# Patient Record
Sex: FEMALE | Race: WHITE | HISPANIC OR LATINO | Employment: UNEMPLOYED | URBAN - METROPOLITAN AREA
[De-identification: names, ages, dates, MRNs, and addresses within clinical notes are randomized per-mention and may not be internally consistent; named-entity substitution may affect disease eponyms.]

---

## 2020-03-31 ENCOUNTER — HOSPITAL ENCOUNTER (EMERGENCY)
Facility: HOSPITAL | Age: 25
Discharge: HOME | End: 2020-03-31
Attending: EMERGENCY MEDICINE
Payer: OTHER GOVERNMENT

## 2020-03-31 VITALS
RESPIRATION RATE: 18 BRPM | HEIGHT: 64 IN | OXYGEN SATURATION: 99 % | TEMPERATURE: 101 F | SYSTOLIC BLOOD PRESSURE: 147 MMHG | WEIGHT: 220 LBS | BODY MASS INDEX: 37.56 KG/M2 | DIASTOLIC BLOOD PRESSURE: 69 MMHG | HEART RATE: 99 BPM

## 2020-03-31 DIAGNOSIS — B34.9 VIRAL ILLNESS: ICD-10-CM

## 2020-03-31 DIAGNOSIS — Z20.822 SUSPECTED COVID-19 VIRUS INFECTION: Primary | ICD-10-CM

## 2020-03-31 LAB
B-HCG UR QL: NEGATIVE
CTP QC/QA: YES
CTP QC/QA: YES
POC MOLECULAR INFLUENZA A AGN: NEGATIVE
POC MOLECULAR INFLUENZA B AGN: NEGATIVE

## 2020-03-31 PROCEDURE — 99283 EMERGENCY DEPT VISIT LOW MDM: CPT | Mod: 25

## 2020-03-31 PROCEDURE — 87502 INFLUENZA DNA AMP PROBE: CPT

## 2020-03-31 PROCEDURE — 25000003 PHARM REV CODE 250: Performed by: PHYSICIAN ASSISTANT

## 2020-03-31 PROCEDURE — 81025 URINE PREGNANCY TEST: CPT | Performed by: PHYSICIAN ASSISTANT

## 2020-03-31 RX ORDER — ACETAMINOPHEN 500 MG
1000 TABLET ORAL
Status: COMPLETED | OUTPATIENT
Start: 2020-03-31 | End: 2020-03-31

## 2020-03-31 RX ADMIN — ACETAMINOPHEN 1000 MG: 500 TABLET ORAL at 10:03

## 2020-03-31 NOTE — ED PROVIDER NOTES
Encounter Date: 3/31/2020    SCRIBE #1 NOTE: I, Marilu Jenkins, am scribing for, and in the presence of,  WILLIAM Arnold. I have scribed the following portions of the note - Other sections scribed: HPI, ROS.       History     Chief Complaint   Patient presents with    Fever     Fever and cough starting yesterday. Denies any SOB. RR unlabored.      Chief Complaint: Fever; Chills; Body Aches  History of Present Illness: History obtained from patient. This 24 y.o. female with no known PMHx presents to the ED complaining of fever, chills, and generalized body aches that started yesterday. She reports having associated headache, nausea, and cough. She states that she's had a decrease in appetite. She notes taking Tylenol for treatment and her last dose was today at 0300. She denies any known sick contact. Denies SOB, chest pain, vomiting, and diarrhea. No other associated symptoms. No known sick contacts.  No recent travel. No risk factors including active cancer, chronic lung disease, history of organ transplant, use of immunosuppressive medications, hemodialysis, advanced HIV.  Patient does not live in a communal setting including nursing facility or home shelter.  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      The history is provided by the patient. No  was used.     Review of patient's allergies indicates:  No Known Allergies  History reviewed. No pertinent past medical history.  Past Surgical History:   Procedure Laterality Date     SECTION      TUBAL LIGATION       History reviewed. No pertinent family history.  Social History     Tobacco Use    Smoking status: Never Smoker   Substance Use Topics    Alcohol use: Never     Frequency: Never    Drug use: Never     Review of Systems   Constitutional: Positive for appetite change, chills and fever.   HENT: Negative for sore throat.    Respiratory: Positive for cough. Negative for  shortness of breath.    Cardiovascular: Negative for chest pain.   Gastrointestinal: Positive for nausea. Negative for diarrhea and vomiting.   Genitourinary: Negative for dysuria.   Musculoskeletal: Positive for myalgias. Negative for back pain.   Skin: Negative for rash.   Neurological: Positive for headaches. Negative for weakness.   Hematological: Does not bruise/bleed easily.       Physical Exam     Initial Vitals [03/31/20 1006]   BP Pulse Resp Temp SpO2   (!) 143/85 108 18 (!) 101.4 °F (38.6 °C) 100 %      MAP       --         Physical Exam    Nursing note and vitals reviewed.  Constitutional: She appears well-developed and well-nourished. No distress.   HENT:   Head: Normocephalic and atraumatic.   Right Ear: Tympanic membrane normal.   Left Ear: Tympanic membrane normal.   Nose: Nose normal.   Mouth/Throat: Uvula is midline, oropharynx is clear and moist and mucous membranes are normal.   Eyes: EOM are normal. Pupils are equal, round, and reactive to light.   Neck: Trachea normal, normal range of motion, full passive range of motion without pain and phonation normal. Neck supple. No stridor present. No spinous process tenderness and no muscular tenderness present. Normal range of motion present. No neck rigidity.   Cardiovascular: Normal rate, regular rhythm and normal heart sounds. Exam reveals no gallop and no friction rub.    No murmur heard.  Pulmonary/Chest: Effort normal and breath sounds normal. No respiratory distress. She has no wheezes. She has no rhonchi. She has no rales.   Abdominal: Soft. Bowel sounds are normal. There is no tenderness. There is no rebound and no guarding.   Musculoskeletal: Normal range of motion.   Neurological: She is alert and oriented to person, place, and time. She has normal strength. No cranial nerve deficit or sensory deficit.   Skin: Skin is warm and dry. Capillary refill takes less than 2 seconds.   Psychiatric: She has a normal mood and affect.         ED Course    Procedures  Labs Reviewed   POCT URINE PREGNANCY   POCT INFLUENZA A/B MOLECULAR          Imaging Results    None          Medical Decision Making:   Clinical Tests:   Lab Tests: Ordered and Reviewed  ED Management:  DDx:  Influenza, viral syndrome, COVID-19, strep pharyngitis, viral pharyngitis, otitis media, sinusitis, pneumonia, bronchitis, meningitis, sepsis, others    HPI and physical exam as above.      The patient appears to have a viral respiratory infection.  Based upon the history and physical exam the patient does not appear to have a serious bacterial infection such as sepsis, otitis media, bacterial sinusitis, strep pharyngitis, parapharyngeal or peritonsillar abscess, meningitis.  Influenza swab was negative.  Respiratory effort is normal without tachypnea or obvious signs of respiratory distress. Lungs are clear to auscultation bilaterally in all fields. Mucous membranes are moist and the patient is tolerating P.O. without difficulty.  The patient will not be tested for SARS-COV-2 given absence of risk factors in compliance with current testing guidelines.  Patient is febrile.  Fever improved after treatment in the ED with Tylenol.  Patient is nontoxic, alert, active, and appears very well at this time just prior to discharge.  Room air oxygen saturation 99%.  I have given specific return precautions to the patient regarding dyspnea.  I will prescribe medications to treat the patient's symptoms.     The results and physical exam findings were reviewed with the patient.  I advised the patient to remain home and self isolate from others for 2 weeks. The patient should wear a surgical mask at all times, especially if she must be around others.  Strict ED return precautions given concerning worsening shortness of breath/dyspnea. All questions regarding diagnosis and plan were answered to the patient's fullest possible satisfaction. Patient expressed understanding of diagnosis, discharge instructions, and  return precautions.     I used full PPE and gloves while evaluating this patient. I also used a CAPR while evaluating this patient. I cleaned my stethoscope before and after evaluating along with my hands.               Scribe Attestation:   Scribe #1: I performed the above scribed service and the documentation accurately describes the services I performed. I attest to the accuracy of the note.                          Clinical Impression:       ICD-10-CM ICD-9-CM   1. Suspected Covid-19 Virus Infection R68.89    2. Viral illness B34.9 079.99             ED Disposition Condition    Discharge Stable        ED Prescriptions     None        Follow-up Information     Follow up With Specialties Details Why Contact Info    [facility]  Schedule an appointment as soon as possible for a visit in 1 day For reevaluation [address] [place] [address]  [phone]      [facility] Go in 1 day If symptoms worsen [address]  [facility] [id number] [phone]                      Scribe Attestation: I,Roman Nicole , personally performed the services described in this documentation. All medical record entries made by the scribe were at my direction and in my presence. I have reviewed the chart and agree that the record reflects my personal performance and is accurate and complete.               Roman Nicole PA-C  03/31/20 1036

## 2020-04-16 ENCOUNTER — HOSPITAL ENCOUNTER (INPATIENT)
Facility: HOSPITAL | Age: 25
LOS: 11 days | Discharge: HOME | DRG: 982 | End: 2020-04-27
Attending: EMERGENCY MEDICINE | Admitting: INTERNAL MEDICINE
Payer: OTHER GOVERNMENT

## 2020-04-16 DIAGNOSIS — I80.202 PHLEGMASIA CERULEA DOLENS OF LEFT LOWER EXTREMITY: ICD-10-CM

## 2020-04-16 DIAGNOSIS — I82.422 ACUTE DEEP VEIN THROMBOSIS (DVT) OF ILIAC VEIN OF LEFT LOWER EXTREMITY: ICD-10-CM

## 2020-04-16 DIAGNOSIS — R00.0 TACHYCARDIA: ICD-10-CM

## 2020-04-16 DIAGNOSIS — I82.412 ACUTE DEEP VEIN THROMBOSIS (DVT) OF FEMORAL VEIN OF LEFT LOWER EXTREMITY: ICD-10-CM

## 2020-04-16 DIAGNOSIS — I82.409 DVT (DEEP VENOUS THROMBOSIS): ICD-10-CM

## 2020-04-16 DIAGNOSIS — I82.422 ACUTE DEEP VEIN THROMBOSIS OF LEFT ILIAC VEIN: ICD-10-CM

## 2020-04-16 DIAGNOSIS — I82.4Z2 ACUTE DEEP VEIN THROMBOSIS (DVT) OF DISTAL VEIN OF LEFT LOWER EXTREMITY: Primary | ICD-10-CM

## 2020-04-16 DIAGNOSIS — U07.1 COVID-19 VIRUS DETECTED: ICD-10-CM

## 2020-04-16 DIAGNOSIS — M79.605 ACUTE PAIN OF LEFT LOWER EXTREMITY: ICD-10-CM

## 2020-04-16 PROBLEM — Z20.822 SUSPECTED COVID-19 VIRUS INFECTION: Status: ACTIVE | Noted: 2020-04-16

## 2020-04-16 PROBLEM — E66.9 OBESITY (BMI 35.0-39.9 WITHOUT COMORBIDITY): Status: ACTIVE | Noted: 2020-04-16

## 2020-04-16 LAB
ALBUMIN SERPL BCP-MCNC: 4.4 G/DL (ref 3.5–5.2)
ALP SERPL-CCNC: 67 U/L (ref 55–135)
ALT SERPL W/O P-5'-P-CCNC: 35 U/L (ref 10–44)
ANION GAP SERPL CALC-SCNC: 11 MMOL/L (ref 8–16)
APTT BLDCRRT: 29.6 SEC (ref 21–32)
AST SERPL-CCNC: 26 U/L (ref 10–40)
B-HCG UR QL: NEGATIVE
BASOPHILS # BLD AUTO: 0.01 K/UL (ref 0–0.2)
BASOPHILS # BLD AUTO: 0.02 K/UL (ref 0–0.2)
BASOPHILS NFR BLD: 0.1 % (ref 0–1.9)
BASOPHILS NFR BLD: 0.2 % (ref 0–1.9)
BILIRUB SERPL-MCNC: 0.8 MG/DL (ref 0.1–1)
BUN SERPL-MCNC: 9 MG/DL (ref 6–20)
CALCIUM SERPL-MCNC: 10 MG/DL (ref 8.7–10.5)
CHLORIDE SERPL-SCNC: 107 MMOL/L (ref 95–110)
CK SERPL-CCNC: 108 U/L (ref 20–180)
CO2 SERPL-SCNC: 23 MMOL/L (ref 23–29)
CREAT SERPL-MCNC: 0.9 MG/DL (ref 0.5–1.4)
CRP SERPL-MCNC: 67.3 MG/L (ref 0–8.2)
CTP QC/QA: YES
DIFFERENTIAL METHOD: ABNORMAL
DIFFERENTIAL METHOD: ABNORMAL
EOSINOPHIL # BLD AUTO: 0.1 K/UL (ref 0–0.5)
EOSINOPHIL # BLD AUTO: 0.2 K/UL (ref 0–0.5)
EOSINOPHIL NFR BLD: 0.8 % (ref 0–8)
EOSINOPHIL NFR BLD: 1.9 % (ref 0–8)
ERYTHROCYTE [DISTWIDTH] IN BLOOD BY AUTOMATED COUNT: 12.5 % (ref 11.5–14.5)
ERYTHROCYTE [DISTWIDTH] IN BLOOD BY AUTOMATED COUNT: 12.8 % (ref 11.5–14.5)
EST. GFR  (AFRICAN AMERICAN): >60 ML/MIN/1.73 M^2
EST. GFR  (NON AFRICAN AMERICAN): >60 ML/MIN/1.73 M^2
FERRITIN SERPL-MCNC: 233 NG/ML (ref 20–300)
GLUCOSE SERPL-MCNC: 107 MG/DL (ref 70–110)
HCT VFR BLD AUTO: 30.8 % (ref 37–48.5)
HCT VFR BLD AUTO: 36.5 % (ref 37–48.5)
HGB BLD-MCNC: 11.7 G/DL (ref 12–16)
HGB BLD-MCNC: 9.9 G/DL (ref 12–16)
IMM GRANULOCYTES # BLD AUTO: 0.06 K/UL (ref 0–0.04)
IMM GRANULOCYTES # BLD AUTO: 0.07 K/UL (ref 0–0.04)
IMM GRANULOCYTES NFR BLD AUTO: 0.7 % (ref 0–0.5)
IMM GRANULOCYTES NFR BLD AUTO: 0.8 % (ref 0–0.5)
INR PPP: 1 (ref 0.8–1.2)
LDH SERPL L TO P-CCNC: 309 U/L (ref 110–260)
LYMPHOCYTES # BLD AUTO: 0.7 K/UL (ref 1–4.8)
LYMPHOCYTES # BLD AUTO: 1.4 K/UL (ref 1–4.8)
LYMPHOCYTES NFR BLD: 15.7 % (ref 18–48)
LYMPHOCYTES NFR BLD: 7.5 % (ref 18–48)
MCH RBC QN AUTO: 27.6 PG (ref 27–31)
MCH RBC QN AUTO: 27.7 PG (ref 27–31)
MCHC RBC AUTO-ENTMCNC: 32.1 G/DL (ref 32–36)
MCHC RBC AUTO-ENTMCNC: 32.1 G/DL (ref 32–36)
MCV RBC AUTO: 86 FL (ref 82–98)
MCV RBC AUTO: 86 FL (ref 82–98)
MONOCYTES # BLD AUTO: 0.7 K/UL (ref 0.3–1)
MONOCYTES # BLD AUTO: 0.8 K/UL (ref 0.3–1)
MONOCYTES NFR BLD: 8.1 % (ref 4–15)
MONOCYTES NFR BLD: 9.1 % (ref 4–15)
NEUTROPHILS # BLD AUTO: 6.6 K/UL (ref 1.8–7.7)
NEUTROPHILS # BLD AUTO: 7.3 K/UL (ref 1.8–7.7)
NEUTROPHILS NFR BLD: 73.3 % (ref 38–73)
NEUTROPHILS NFR BLD: 81.8 % (ref 38–73)
NRBC BLD-RTO: 0 /100 WBC
NRBC BLD-RTO: 0 /100 WBC
PLATELET # BLD AUTO: 252 K/UL (ref 150–350)
PLATELET # BLD AUTO: 316 K/UL (ref 150–350)
PMV BLD AUTO: 11.4 FL (ref 9.2–12.9)
PMV BLD AUTO: 11.6 FL (ref 9.2–12.9)
POTASSIUM SERPL-SCNC: 4.5 MMOL/L (ref 3.5–5.1)
PROCALCITONIN SERPL IA-MCNC: 0.02 NG/ML
PROT SERPL-MCNC: 8.6 G/DL (ref 6–8.4)
PROTHROMBIN TIME: 11.4 SEC (ref 9–12.5)
RBC # BLD AUTO: 3.58 M/UL (ref 4–5.4)
RBC # BLD AUTO: 4.24 M/UL (ref 4–5.4)
SARS-COV-2 RDRP RESP QL NAA+PROBE: POSITIVE
SODIUM SERPL-SCNC: 141 MMOL/L (ref 136–145)
WBC # BLD AUTO: 8.96 K/UL (ref 3.9–12.7)
WBC # BLD AUTO: 8.97 K/UL (ref 3.9–12.7)

## 2020-04-16 PROCEDURE — 85730 THROMBOPLASTIN TIME PARTIAL: CPT | Mod: 91

## 2020-04-16 PROCEDURE — 20000000 HC ICU ROOM

## 2020-04-16 PROCEDURE — 83615 LACTATE (LD) (LDH) ENZYME: CPT

## 2020-04-16 PROCEDURE — 82550 ASSAY OF CK (CPK): CPT

## 2020-04-16 PROCEDURE — 86140 C-REACTIVE PROTEIN: CPT

## 2020-04-16 PROCEDURE — 85025 COMPLETE CBC W/AUTO DIFF WBC: CPT | Mod: 91

## 2020-04-16 PROCEDURE — 82728 ASSAY OF FERRITIN: CPT

## 2020-04-16 PROCEDURE — 96376 TX/PRO/DX INJ SAME DRUG ADON: CPT

## 2020-04-16 PROCEDURE — U0002 COVID-19 LAB TEST NON-CDC: HCPCS

## 2020-04-16 PROCEDURE — 36415 COLL VENOUS BLD VENIPUNCTURE: CPT

## 2020-04-16 PROCEDURE — 81025 URINE PREGNANCY TEST: CPT | Performed by: EMERGENCY MEDICINE

## 2020-04-16 PROCEDURE — 85730 THROMBOPLASTIN TIME PARTIAL: CPT

## 2020-04-16 PROCEDURE — 84145 PROCALCITONIN (PCT): CPT

## 2020-04-16 PROCEDURE — 96375 TX/PRO/DX INJ NEW DRUG ADDON: CPT

## 2020-04-16 PROCEDURE — 93005 ELECTROCARDIOGRAM TRACING: CPT

## 2020-04-16 PROCEDURE — 85379 FIBRIN DEGRADATION QUANT: CPT

## 2020-04-16 PROCEDURE — 96365 THER/PROPH/DIAG IV INF INIT: CPT

## 2020-04-16 PROCEDURE — 25000003 PHARM REV CODE 250: Performed by: RADIOLOGY

## 2020-04-16 PROCEDURE — 85610 PROTHROMBIN TIME: CPT | Mod: 91

## 2020-04-16 PROCEDURE — 63600175 PHARM REV CODE 636 W HCPCS: Performed by: RADIOLOGY

## 2020-04-16 PROCEDURE — 99291 CRITICAL CARE FIRST HOUR: CPT | Mod: 25

## 2020-04-16 PROCEDURE — 25000003 PHARM REV CODE 250: Performed by: EMERGENCY MEDICINE

## 2020-04-16 PROCEDURE — 85610 PROTHROMBIN TIME: CPT

## 2020-04-16 PROCEDURE — 85384 FIBRINOGEN ACTIVITY: CPT

## 2020-04-16 PROCEDURE — 63600175 PHARM REV CODE 636 W HCPCS: Performed by: EMERGENCY MEDICINE

## 2020-04-16 PROCEDURE — 96366 THER/PROPH/DIAG IV INF ADDON: CPT

## 2020-04-16 PROCEDURE — 25500020 PHARM REV CODE 255: Performed by: INTERNAL MEDICINE

## 2020-04-16 PROCEDURE — 80053 COMPREHEN METABOLIC PANEL: CPT

## 2020-04-16 RX ORDER — FENTANYL CITRATE 50 UG/ML
INJECTION, SOLUTION INTRAMUSCULAR; INTRAVENOUS CODE/TRAUMA/SEDATION MEDICATION
Status: COMPLETED | OUTPATIENT
Start: 2020-04-16 | End: 2020-04-16

## 2020-04-16 RX ORDER — ONDANSETRON 4 MG/1
4 TABLET, ORALLY DISINTEGRATING ORAL
Status: COMPLETED | OUTPATIENT
Start: 2020-04-16 | End: 2020-04-16

## 2020-04-16 RX ORDER — MIDAZOLAM HYDROCHLORIDE 1 MG/ML
INJECTION INTRAMUSCULAR; INTRAVENOUS CODE/TRAUMA/SEDATION MEDICATION
Status: COMPLETED | OUTPATIENT
Start: 2020-04-16 | End: 2020-04-16

## 2020-04-16 RX ORDER — HYDROCODONE BITARTRATE AND ACETAMINOPHEN 5; 325 MG/1; MG/1
1 TABLET ORAL
Status: COMPLETED | OUTPATIENT
Start: 2020-04-16 | End: 2020-04-16

## 2020-04-16 RX ORDER — DEXTROMETHORPHAN HYDROBROMIDE, GUAIFENESIN 5; 100 MG/5ML; MG/5ML
650 LIQUID ORAL EVERY 8 HOURS
Status: ON HOLD | COMMUNITY
End: 2020-04-27 | Stop reason: HOSPADM

## 2020-04-16 RX ORDER — HYDROMORPHONE HYDROCHLORIDE 2 MG/ML
0.5 INJECTION, SOLUTION INTRAMUSCULAR; INTRAVENOUS; SUBCUTANEOUS
Status: COMPLETED | OUTPATIENT
Start: 2020-04-16 | End: 2020-04-16

## 2020-04-16 RX ORDER — HEPARIN SODIUM 10000 [USP'U]/100ML
500 INJECTION, SOLUTION INTRAVENOUS CONTINUOUS
Status: DISCONTINUED | OUTPATIENT
Start: 2020-04-16 | End: 2020-04-18

## 2020-04-16 RX ORDER — SODIUM CHLORIDE 0.9 % (FLUSH) 0.9 %
10 SYRINGE (ML) INJECTION
Status: DISCONTINUED | OUTPATIENT
Start: 2020-04-16 | End: 2020-04-27 | Stop reason: HOSPADM

## 2020-04-16 RX ORDER — HEPARIN SODIUM,PORCINE/D5W 25000/250
18 INTRAVENOUS SOLUTION INTRAVENOUS CONTINUOUS
Status: DISCONTINUED | OUTPATIENT
Start: 2020-04-16 | End: 2020-04-16

## 2020-04-16 RX ORDER — NALOXONE HCL 0.4 MG/ML
0.02 VIAL (ML) INJECTION
Status: DISCONTINUED | OUTPATIENT
Start: 2020-04-16 | End: 2020-04-27 | Stop reason: HOSPADM

## 2020-04-16 RX ORDER — HYDROMORPHONE HCL IN 0.9% NACL 6 MG/30 ML
PATIENT CONTROLLED ANALGESIA SYRINGE INTRAVENOUS CONTINUOUS
Status: DISCONTINUED | OUTPATIENT
Start: 2020-04-16 | End: 2020-04-18

## 2020-04-16 RX ORDER — SODIUM CHLORIDE 9 MG/ML
INJECTION, SOLUTION INTRAVENOUS CONTINUOUS
Status: DISCONTINUED | OUTPATIENT
Start: 2020-04-16 | End: 2020-04-16

## 2020-04-16 RX ADMIN — HEPARIN SODIUM AND DEXTROSE 200 UNITS/HR: 10000; 5 INJECTION INTRAVENOUS at 07:04

## 2020-04-16 RX ADMIN — HEPARIN SODIUM 18 UNITS/KG/HR: 10000 INJECTION, SOLUTION INTRAVENOUS at 02:04

## 2020-04-16 RX ADMIN — HYDROCODONE BITARTRATE AND ACETAMINOPHEN 1 TABLET: 5; 325 TABLET ORAL at 10:04

## 2020-04-16 RX ADMIN — FENTANYL CITRATE 50 MCG: 50 INJECTION INTRAMUSCULAR; INTRAVENOUS at 06:04

## 2020-04-16 RX ADMIN — IOHEXOL 42 ML: 300 INJECTION, SOLUTION INTRAVENOUS at 08:04

## 2020-04-16 RX ADMIN — FENTANYL CITRATE 25 MCG: 50 INJECTION INTRAMUSCULAR; INTRAVENOUS at 07:04

## 2020-04-16 RX ADMIN — ALTEPLASE 1 MG/HR: 2.2 INJECTION, POWDER, LYOPHILIZED, FOR SOLUTION INTRAVENOUS at 07:04

## 2020-04-16 RX ADMIN — ALTEPLASE 2 MG: 2.2 INJECTION, POWDER, LYOPHILIZED, FOR SOLUTION INTRAVENOUS at 06:04

## 2020-04-16 RX ADMIN — MIDAZOLAM HYDROCHLORIDE 0.5 MG: 1 INJECTION, SOLUTION INTRAMUSCULAR; INTRAVENOUS at 06:04

## 2020-04-16 RX ADMIN — HYDROMORPHONE HYDROCHLORIDE 0.5 MG: 2 INJECTION INTRAMUSCULAR; INTRAVENOUS; SUBCUTANEOUS at 12:04

## 2020-04-16 RX ADMIN — Medication: at 09:04

## 2020-04-16 RX ADMIN — MIDAZOLAM HYDROCHLORIDE 0.5 MG: 1 INJECTION, SOLUTION INTRAMUSCULAR; INTRAVENOUS at 07:04

## 2020-04-16 RX ADMIN — FENTANYL CITRATE 50 MCG: 50 INJECTION INTRAMUSCULAR; INTRAVENOUS at 07:04

## 2020-04-16 RX ADMIN — MIDAZOLAM HYDROCHLORIDE 1 MG: 1 INJECTION, SOLUTION INTRAMUSCULAR; INTRAVENOUS at 07:04

## 2020-04-16 RX ADMIN — ALTEPLASE 10 MG: 2.2 INJECTION, POWDER, LYOPHILIZED, FOR SOLUTION INTRAVENOUS at 07:04

## 2020-04-16 RX ADMIN — MIDAZOLAM HYDROCHLORIDE 1 MG: 1 INJECTION, SOLUTION INTRAMUSCULAR; INTRAVENOUS at 06:04

## 2020-04-16 RX ADMIN — ONDANSETRON 4 MG: 4 TABLET, ORALLY DISINTEGRATING ORAL at 12:04

## 2020-04-16 RX ADMIN — FENTANYL CITRATE 25 MCG: 50 INJECTION INTRAMUSCULAR; INTRAVENOUS at 06:04

## 2020-04-16 NOTE — ED NOTES
Pt reports left upper leg pain pain since Tuesday, reports pain started in lower back and now in leg. Swelling noted to left upper leg with mild redness.

## 2020-04-16 NOTE — ASSESSMENT & PLAN NOTE
Palliative Care Focus Note  Palliative Care Coordinator made attempt to reach daughter Daisy.  Able to leave a message requesting return call.  Purpose of call was to share that [employer] does not have a copy of the activation for Franchesca's power of  for health care.  Asked that Daisy bring a copy in next time she comes to visit her mom.     Addendum: Received voicemail from daughter this afternoon.  She expressed that she was here at the hospital and had a copy of activation form.  Writer was unable to see daughter when she was here and checked with SAMANTHA Zelaya caring for Franchseca today.  She did not have a copy of the form. Writer placed call out to daughter again this afternoon and asked that if she is visiting over the weekend, to leave a copy with the nurse.      Thank you again for involving Palliative Care in the care of Franchesca Kirkpatrick.  We will continue to follow along with you.    ISSA Chaparro, [facility]  Palliative Care Coordinator  [facility]   Phone Number: [phone]     Recommend diet and lifestyle modifications outpatient

## 2020-04-16 NOTE — CONSULTS
Inpatient Radiology Pre-procedure Note    History of Present Illness:  Wayne Garces is a 24 y.o. female with no pertinent PMHx who presents to [facility] ED with c/o acute onset of severe LLE pain with swelling that began approximately 4 days prior with significant interval worsening of symptoms over the last 48 hours. Pt describes decreased ROM of left foot but not altered sensation at this time. No h/o smoking, OCP use thrombogenic disorders, prior DVT ect. However, pt reports relative immobility over previous 2 weeks 2/2 to fatigue and muscle aches.     LLE venous US shows acute, extensive occlusive DVT extending from the distal tibial veins through the femoropopliteal veins and into the ipsilateral iliac veins increasing risk of potential development of PTS which can be significantly debilitating, especially in pt of 25 y/o age. Also, increase risk of thromboembolic phenomenon given extension of DVT above the inguinal ligament.     Plan was for pt to be transferred to [facility] for Vasc Surg consult and intervention however, transfer is reportedly not possible given pts +COVID19 status.    Given this, new inpatient IR consult placed to evaluate for potential LLE venogram and potential intervention.     Admission H&P reviewed.  History reviewed. No pertinent past medical history.  Past Surgical History:   Procedure Laterality Date     SECTION      TUBAL LIGATION       Review of Systems:   As documented in primary team H&P    Home Meds:   Prior to Admission medications    Medication Sig Start Date End Date Taking? Authorizing Provider   acetaminophen (TYLENOL) 650 MG TbSR Take 650 mg by mouth every 8 (eight) hours.   Yes Historical Provider, MD     Scheduled Meds:    alteplase (ACTIVASE) 10 mg in 0.9% NaCl 100 mL  10 mg Intra-Catheter Once    alteplase  2 mg Intra-Catheter Once    alteplase  2 mg Intra-Catheter Once     Continuous Infusions:    heparin (porcine) in D5W 18 Units/kg/hr  (04/16/20 1401)     PRN Meds:heparin (PORCINE), heparin (PORCINE)     Anticoagulants/Antiplatelets: Heparin    Allergies: Review of patient's allergies indicates:  No Known Allergies     Sedation Hx: have not been any systemic reactions    Labs:  Recent Labs   Lab 04/16/20  1038   INR 1.0       Recent Labs   Lab 04/16/20  1038   WBC 8.96   HGB 11.7*   HCT 36.5*   MCV 86         Recent Labs   Lab 04/16/20  1038         K 4.5      CO2 23   BUN 9   CREATININE 0.9   CALCIUM 10.0   ALT 35   AST 26   ALBUMIN 4.4   BILITOT 0.8     Vitals:  Temp: 99.5 °F (37.5 °C) (04/16/20 1649)  Pulse: 100 (04/16/20 1649)  Resp: 18 (04/16/20 1649)  BP: (!) 173/94 (04/16/20 1647)  SpO2: 98 % (04/16/20 1649)     Physical Exam:  ASA: III  Mallampati: II    General: no acute distress  Mental Status: alert and oriented to person, place and time  HEENT: normocephalic, atraumatic  Chest: unlabored breathing  Heart: regular heart rate  Abdomen: nondistended  Extremity: moves all extremities with mild decreased ROM of left toes and foot with minimal discoloration, moderate to severe swelling and exquisite TTP.    A/P:   24 y.o. female with extensive acute, occlusive DVT extending from the distal tibial veins through the femoropopliteal veins and into the ipsilateral iliac veins increasing risk of potential development of PTS which can be significantly debilitating, especially in pt of 23 y/o age. Also, increase risk of thromboembolic phenomenon given extension of DVT above the inguinal ligament.     1. Acute, occlusive extensive LLE DVT - Will attempt LLE venogram, rheolytic/pharmacomechanical thrombectomy, potential for further intervention with likely over night thrombolysis/tPA infusion and follow up venogram with possible further intervention the following day.     Risks (including, but not limited to, pain, bleeding, infection, damage to nearby structures, failure to obtain sufficient material for a diagnosis, the  need for additional procedures, and death), benefits, and alternatives were discussed with the patient. All questions were answered to the best of my abilities. The patient wishes to proceed with the procedure. Written informed consent was obtained.    Thank you for considering IR for the care of your patient.     Darin Boo MD  Interventional Radiology

## 2020-04-16 NOTE — HPI
This is 24 y.o female with no medical history who presents to the hospital complaint of left leg pain for 4 days and progressively worsening for the last 2 days. Patient reports pain starting at her left thigh and radiating throughout the leg for the last 2 days. Associated symptoms are redness and swelling of left upper leg. Pain described as continuous aching 10/10 during the interview. She reports pain getting better if she raised her leg for the first 2 days but now nothing can help. She tried to take Tylenol at home but Tylenol doesn't help the leg pain just helps with her headache only. She finds she has descreased range of motion of the knee secondary to pain but denies any numbness in the leg. She reports she went to the hospital on 3/31/2020 because of fever, cough and body ache but sent home without Covid 19 testing. She has not been ambulating much at home secondary to body aches and fatigue. Denies any SOB, fever, chest pain, abdominal pain, n/v/d. No history of clotting disorder or DVT. Not on birth control pills. Denies smoking. Surgical history includes  section and tubal ligation per report.    In ED, US LLE vein shows extensive deep venous thrombosis involving the left common femoral vein, femoral vein, greater saphenous vein, popliteal vein, as well as anterior and posterior tibial veins and peroneal vein. EKG with sinus tachycardia 126 H/H 11.7/36.5; coagulation studies normal; Covid-19 positive.    History obtained via language line.

## 2020-04-16 NOTE — ASSESSMENT & PLAN NOTE
-Imaging reviewed.  Acute L iliofemoral, popliteal and tibial DVT with motor compromise - rec pharmacomechanical thrombectomy  -Start heparin drip  -NPO  -Hydration with IVF  -Elevate LLE  -Place compression stocking

## 2020-04-16 NOTE — ASSESSMENT & PLAN NOTE
Pain at left leg x 4 days. Worsen pain , cyanosis and edema x 2 days. US Vein LLE shows acute L iliofemoral, popliteal and tibial DVT. S/P lysis with IR today  -IR/Vascular consulted  -Continue TPA infusion in the ICU  -echo pending  -neurovascular checks  -bed rest

## 2020-04-16 NOTE — PROGRESS NOTES
Patient will need Xarelto at discharge.  ALLYSON discussed with CM, Chikis MANRIQUEZ, that an Rx assistance application might be able to be completed.  Patient has ID Card from [employer].      ALLYSON spoke with Thomas of [employer] Rx assistance program to ask if it was necessary for patient to have a SS# to apply and what was the turn around time.    Per Thomas, patient does not have to have a social security number to apply for assistance.  The most expedient method to get application approved would be to fax it in.  Turn around time is 7-10 business days.      SW attempted to contact patient at [phone] using the Language Line (Wannyi ID#[id number]) to obtain some information to begin the application.  There was no answer and patient's voice mailbox was not setup.    A  will continue to f/u with patient.  Patient can complete the application for Xarelto and the doctor or NP must complete their portion of the application.    Patient can also use a coupon for a free 30 day Trial Supply.  CM may be able to get response to the application within in 24 hours.      Sarah Noguera LMSW, [facility]  4/16/20

## 2020-04-16 NOTE — CONSULTS
[facility]  Vascular Surgery  Consult Note    Inpatient consult to Vascular Surgery  Consult performed by: Tairq Pelaez MD  Consult ordered by: Pacheco Garcia MD        Subjective:     Chief Complaint/Reason for Admission: LLE DVT    History of Present Illness:             Tariq Pelaez MD RPVI                       [facility]                         2020    HPI:  Wayne Garces is a 24 y.o. female with There is no problem list on file for this patient.   being managed by PCP and specialists who is here today for evaluation of LLE pain.  Patient states location is LLE and L back occurring for 2 days.  Associated signs and symptoms include edema and .  Quality is aching and severity is 6/10.  Symptoms began 2 days ago, worsening yesterday and today.  Alleviating factors include elevation.  Worsening factors include dependency.  Denies FH of clotting disorder, personal history of DVT and venous interventions.  No birth control.  States she has been immobile for 2 weeks due to muscle aches.  No pregnancy currently or h/o spontaneous abortions.    no MI  no Stroke  Tobacco use: denies    History reviewed. No pertinent past medical history.  Past Surgical History:   Procedure Laterality Date     SECTION      TUBAL LIGATION       No family history on file.  Social History     Socioeconomic History    Marital status: Single     Spouse name: Not on file    Number of children: Not on file    Years of education: Not on file    Highest education level: Not on file   Occupational History    Not on file   Social Needs    Financial resource strain: Not on file    Food insecurity:     Worry: Not on file     Inability: Not on file    Transportation needs:     Medical: Not on file     Non-medical: Not on file   Tobacco Use    Smoking status: Never Smoker   Substance and Sexual Activity    Alcohol use: Never     Frequency: Never    Drug use:  Never    Sexual activity: Not on file   Lifestyle    Physical activity:     Days per week: Not on file     Minutes per session: Not on file    Stress: Not on file   Relationships    Social connections:     Talks on phone: Not on file     Gets together: Not on file     Attends Restorationist service: Not on file     Active member of club or organization: Not on file     Attends meetings of clubs or organizations: Not on file     Relationship status: Not on file   Other Topics Concern    Not on file   Social History Narrative    Not on file       Current Facility-Administered Medications:     heparin 25,000 units in dextrose 5% (100 units/ml) IV bolus from bag - ADDITIONAL PRN BOLUS - 30 units/kg, 30 Units/kg (Adjusted), Intravenous, PRN, Pacheco Garcia MD    heparin 25,000 units in dextrose 5% (100 units/ml) IV bolus from bag - ADDITIONAL PRN BOLUS - 60 units/kg, 60 Units/kg (Adjusted), Intravenous, PRN, Pacheco Garcia MD    heparin 25,000 units in dextrose 5% 250 mL (100 units/mL) infusion HIGH INTENSITY nomogram - [facility], 18 Units/kg/hr (Adjusted), Intravenous, Continuous, Pacheco Garcia MD, Last Rate: 13.1 mL/hr at 20 1401, 18 Units/kg/hr at 20 1401    Current Outpatient Medications:     acetaminophen (TYLENOL) 650 MG TbSR, Take 650 mg by mouth every 8 (eight) hours., Disp: , Rfl:         (Not in a hospital admission)    Review of patient's allergies indicates:  No Known Allergies    History reviewed. No pertinent past medical history.  Past Surgical History:   Procedure Laterality Date     SECTION      TUBAL LIGATION       Family History     None        Tobacco Use    Smoking status: Never Smoker   Substance and Sexual Activity    Alcohol use: Never     Frequency: Never    Drug use: Never    Sexual activity: Not on file     Review of Systems   Constitutional: Negative for chills.   HENT: Negative for congestion.    Eyes: Negative for visual disturbance.   Respiratory: Negative  for shortness of breath.    Cardiovascular: Negative for chest pain.   Gastrointestinal: Negative for abdominal distention.   Endocrine: Negative for cold intolerance.   Genitourinary: Negative for flank pain.   Musculoskeletal: Negative for back pain.   Skin: Negative for color change, pallor, rash and wound.   Allergic/Immunologic: Negative for immunocompromised state.   Neurological: Negative for dizziness.   Hematological: Does not bruise/bleed easily.   Psychiatric/Behavioral: Negative for agitation.     Objective:     Vital Signs (Most Recent):  Temp: 99 °F (37.2 °C) (04/16/20 1341)  Pulse: 93 (04/16/20 1431)  Resp: (!) 28 (04/16/20 1217)  BP: (!) 155/91 (04/16/20 1431)  SpO2: 99 % (04/16/20 1431) Vital Signs (24h Range):  Temp:  [99 °F (37.2 °C)-99.1 °F (37.3 °C)] 99 °F (37.2 °C)  Pulse:  [] 93  Resp:  [20-28] 28  SpO2:  [97 %-100 %] 99 %  BP: (135-155)/(78-91) 155/91     Weight: 99.8 kg (220 lb)  Body mass index is 37.76 kg/m².    Physical Exam   Constitutional: She is oriented to person, place, and time. She appears well-developed and well-nourished. No distress.   HENT:   Head: Normocephalic and atraumatic.   Eyes: Conjunctivae are normal.   Neck: Neck supple.   Cardiovascular: Normal rate.   Pulmonary/Chest: Effort normal. No respiratory distress.   Abdominal: Soft. She exhibits no distension and no mass. There is no tenderness. There is no rebound and no guarding. No hernia.   Musculoskeletal: She exhibits edema and tenderness. She exhibits no deformity.        Right shoulder: She exhibits decreased range of motion, tenderness and swelling.   Neurological: She is alert and oriented to person, place, and time. No sensory deficit.   Skin: Skin is warm. Capillary refill takes less than 2 seconds. No rash noted. She is not diaphoretic. No erythema. No pallor.   Psychiatric: She has a normal mood and affect.   Vitals reviewed.      Significant Labs:  All pertinent labs from the last 24 hours have been  reviewed.    Significant Diagnostics:  I have reviewed all pertinent imaging results/findings within the past 24 hours.    Assessment/Plan:     Acute deep vein thrombosis (DVT) of iliac vein of left lower extremity  -Imaging reviewed.  Acute L iliofemoral, popliteal and tibial DVT with motor compromise - rec pharmacomechanical thrombectomy  -Start heparin drip  -NPO  -Hydration with IVF  -Elevate LLE  -Place compression stocking        Thank you for your consult. I will follow-up with patient. Please contact us if you have any additional questions.    Tariq Pelaez MD  Vascular Surgery  [facility]

## 2020-04-16 NOTE — ASSESSMENT & PLAN NOTE
H&P - COVID-19 testing   - Infection Control notified     - Isolation:   - Airborne, Contact and Droplet Precautions  - Cohort patients into COVID units  - N95 masks must be fit tested, wear eye protection  - 20 second hand hygiene              - Limit visitors per hospital policy              - Consolidating lab draws, nursing care, provider visits, and interventions    - Diagnostics: (leukopenia, hyponatremia, hyperferritinemia, elevated troponin, elevated d-dimer, age, and comorbidities are significant predictors of poor clinical outcome)  CBC, CMP, Procalcitonin, Ferritin, CRP, LDH, BNP, Troponin, Rapid Flu, Blood Culture x2, Portable CXR and UA and culture    - Management:  Supplemental O2 to maintain SpO2 >92%  Telemetry  Continuous/intermittent Pulse Ox  Albuterol treatment PRN    Advance Care Planning

## 2020-04-16 NOTE — SUBJECTIVE & OBJECTIVE
(Not in a hospital admission)    Review of patient's allergies indicates:  No Known Allergies    History reviewed. No pertinent past medical history.  Past Surgical History:   Procedure Laterality Date     SECTION      TUBAL LIGATION       Family History     None        Tobacco Use    Smoking status: Never Smoker   Substance and Sexual Activity    Alcohol use: Never     Frequency: Never    Drug use: Never    Sexual activity: Not on file     Review of Systems   Constitutional: Negative for chills.   HENT: Negative for congestion.    Eyes: Negative for visual disturbance.   Respiratory: Negative for shortness of breath.    Cardiovascular: Negative for chest pain.   Gastrointestinal: Negative for abdominal distention.   Endocrine: Negative for cold intolerance.   Genitourinary: Negative for flank pain.   Musculoskeletal: Negative for back pain.   Skin: Negative for color change, pallor, rash and wound.   Allergic/Immunologic: Negative for immunocompromised state.   Neurological: Negative for dizziness.   Hematological: Does not bruise/bleed easily.   Psychiatric/Behavioral: Negative for agitation.     Objective:     Vital Signs (Most Recent):  Temp: 99 °F (37.2 °C) (20 1341)  Pulse: 93 (20 1431)  Resp: (!) 28 (20 1217)  BP: (!) 155/91 (20 1431)  SpO2: 99 % (20 1431) Vital Signs (24h Range):  Temp:  [99 °F (37.2 °C)-99.1 °F (37.3 °C)] 99 °F (37.2 °C)  Pulse:  [] 93  Resp:  [20-28] 28  SpO2:  [97 %-100 %] 99 %  BP: (135-155)/(78-91) 155/91     Weight: 99.8 kg (220 lb)  Body mass index is 37.76 kg/m².    Physical Exam   Constitutional: She is oriented to person, place, and time. She appears well-developed and well-nourished. No distress.   HENT:   Head: Normocephalic and atraumatic.   Eyes: Conjunctivae are normal.   Neck: Neck supple.   Cardiovascular: Normal rate.   Pulmonary/Chest: Effort normal. No respiratory distress.   Abdominal: Soft. She exhibits no distension  and no mass. There is no tenderness. There is no rebound and no guarding. No hernia.   Musculoskeletal: She exhibits edema and tenderness. She exhibits no deformity.        Right shoulder: She exhibits decreased range of motion, tenderness and swelling.   Neurological: She is alert and oriented to person, place, and time. No sensory deficit.   Skin: Skin is warm. Capillary refill takes less than 2 seconds. No rash noted. She is not diaphoretic. No erythema. No pallor.   Psychiatric: She has a normal mood and affect.   Vitals reviewed.      Significant Labs:  All pertinent labs from the last 24 hours have been reviewed.    Significant Diagnostics:  I have reviewed all pertinent imaging results/findings within the past 24 hours.

## 2020-04-16 NOTE — SUBJECTIVE & OBJECTIVE
History reviewed. No pertinent past medical history.    Past Surgical History:   Procedure Laterality Date     SECTION      TUBAL LIGATION         Review of patient's allergies indicates:  No Known Allergies    No current facility-administered medications on file prior to encounter.      Current Outpatient Medications on File Prior to Encounter   Medication Sig    acetaminophen (TYLENOL) 650 MG TbSR Take 650 mg by mouth every 8 (eight) hours.     Family History     None        Tobacco Use    Smoking status: Never Smoker   Substance and Sexual Activity    Alcohol use: Never     Frequency: Never    Drug use: Never    Sexual activity: Not on file     Review of Systems   Constitutional: Positive for activity change and fatigue. Negative for appetite change, chills, diaphoresis and fever.   HENT: Positive for congestion. Negative for sore throat.    Eyes: Negative for pain and visual disturbance.   Respiratory: Positive for cough (mild dry cough). Negative for apnea, chest tightness, shortness of breath and wheezing.    Cardiovascular: Positive for leg swelling (Left leg painful and swelling x 2 days). Negative for chest pain and palpitations.   Gastrointestinal: Negative for abdominal distention, abdominal pain, blood in stool, diarrhea, nausea and vomiting.   Genitourinary: Negative for dysuria, flank pain and hematuria.   Musculoskeletal: Positive for myalgias. Negative for neck pain and neck stiffness.   Skin: Positive for color change (left leg).   Neurological: Positive for headaches. Negative for dizziness, tremors, seizures and numbness.     Objective:     Vital Signs (Most Recent):  Temp: 99.5 °F (37.5 °C) (20)  Pulse: 100 (20)  Resp: 18 (20)  BP: (!) 173/94 (20)  SpO2: 98 % (20) Vital Signs (24h Range):  Temp:  [99 °F (37.2 °C)-99.5 °F (37.5 °C)] 99.5 °F (37.5 °C)  Pulse:  [] 100  Resp:  [18-28] 18  SpO2:  [97 %-100 %] 98 %  BP:  (135-173)/(73-94) 173/94     Weight: 99.8 kg (220 lb)  Body mass index is 37.76 kg/m².    Physical Exam   Constitutional: She appears well-developed and well-nourished. No distress.   HENT:   Head: Normocephalic and atraumatic.   Eyes: Pupils are equal, round, and reactive to light. Conjunctivae are normal.   Neck: Normal range of motion. Neck supple. No JVD present.   Cardiovascular: Regular rhythm and normal heart sounds.      Pulmonary/Chest: Effort normal and breath sounds normal. No respiratory distress. She has no wheezes. She has no rales. She exhibits no tenderness.   Abdominal: Soft. Bowel sounds are normal. She exhibits no distension. There is no tenderness. There is no guarding.   Musculoskeletal: She exhibits edema and tenderness (refused to move left leg due to pain).   Very tenderness with palpation at left inguinal area/upper thigh. swelling/redness left leg. Jeronimo DP/PT pulses palpable. Unable to fully flex knee secondary to pain   Neurological: She is alert. No sensory deficit.   Skin: She is not diaphoretic.         CRANIAL NERVES     CN III, IV, VI   Pupils are equal, round, and reactive to light.       Significant Labs:   CBC:   Recent Labs   Lab 04/16/20  1038   WBC 8.96   HGB 11.7*   HCT 36.5*        CMP:   Recent Labs   Lab 04/16/20  1038      K 4.5      CO2 23      BUN 9   CREATININE 0.9   CALCIUM 10.0   PROT 8.6*   ALBUMIN 4.4   BILITOT 0.8   ALKPHOS 67   AST 26   ALT 35   ANIONGAP 11   EGFRNONAA >60     Coagulation:   Recent Labs   Lab 04/16/20  1038   INR 1.0   APTT 29.6       Significant Imaging: I have reviewed and interpreted all pertinent imaging results/findings within the past 24 hours.

## 2020-04-16 NOTE — PLAN OF CARE
After discussing the risks and benefits, the patient would benefit from anticoagulation, elevation and compression to prevent post thrombotic syndrome.  No evidence of phlegmasia.  Minimal edema to left calf, sensory and motor intact.  Communicated plan of care with patient and Dr. Garcia.  Please have patient follow-up with primary care and myself outpatient.

## 2020-04-16 NOTE — ED PROVIDER NOTES
Encounter Date: 2020    SCRIBE #1 NOTE: I, Chris Eaton am scribing for, and in the presence of, Pacheco Garcia MD.       History     Chief Complaint   Patient presents with    Leg Pain     Patient reports left leg pain and swelling that started on Monday, worsened on yesterday. Denies any known injury/trauma to the area. Reports taking Tylenol for the pain, last taken at approx 2100 yesterday.        Time seen by provider: 10:25 AM on 2020    Wayne Garces is a 24 y.o. female who presents to the ED with an onset of worsening left inguinal pain radiating throughout the leg for three days. This is accompanied by progressively developing redness and swelling of the left thigh. She describes her pain as primarily occurring in her thigh rather than her distal leg or calf. This pain reportedly worsens with coughing and sitting down. She does recall a generalized headache yesterday which was resolved with Ibuprofen.  The patient denies any other symptoms at this time, including fever, chills, sweating, ear pain, sore throat, eye pain, vision problems, shortness of breath, chest pain, abdominal pain, vomiting, diarrhea, burning with urination, other extremity pains, recent falls or injuries, rashes, or current headaches. No history of blood clots. Surgical history includes  section and tubal ligation per report. No recorded social history of smoking. Due to a language barrier, a  was used to obtain history and review of symptoms.    The history is provided by the patient. The history is limited by a language barrier. A  was used.     Review of patient's allergies indicates:  No Known Allergies  History reviewed. No pertinent past medical history.  Past Surgical History:   Procedure Laterality Date     SECTION      TUBAL LIGATION       History reviewed. No pertinent family history.  Social History     Tobacco Use    Smoking status: Never  Smoker   Substance Use Topics    Alcohol use: Never     Frequency: Never    Drug use: Never     Review of Systems   Constitutional: Negative for chills, diaphoresis and fever.   HENT: Negative for ear pain and sore throat.    Eyes: Negative for pain and visual disturbance.   Respiratory: Positive for cough. Negative for shortness of breath.    Cardiovascular: Positive for leg swelling. Negative for chest pain.   Gastrointestinal: Negative for abdominal pain, diarrhea and vomiting.   Genitourinary: Positive for pelvic pain. Negative for dysuria.   Musculoskeletal: Positive for myalgias.   Skin: Negative for rash.   Neurological: Negative for headaches.       Physical Exam     Initial Vitals [04/16/20 1006]   BP Pulse Resp Temp SpO2   (!) 139/90 (!) 126 20 99.1 °F (37.3 °C) 99 %      MAP       --         Physical Exam  The patient was examined specifically for the following:   General:No significant distress, Good color, Warm and dry. Head and neck:Scalp atraumatic, Neck supple. Neurological:Appropriate conversation, Gross motor deficits. Eyes:Conjugate gaze, Clear corneas. ENT: No epistaxis. Cardiac: Regular rate and rhythm, Grossly normal heart tones. Pulmonary: Wheezing, Rales. Gastrointestinal: Abdominal tenderness, Abdominal distention. Musculoskeletal: Extremity deformity, Apparent pain with range of motion of the joints. Skin: Rash.   The findings on examination were normal except for the following:  The patient has swelling and tenderness to the left lower extremity.  There is a slight blue color.  Patient has a brisk dorsalis pedis pulse on the left.  She is tender in the left inguinal region left medial thigh.  I feel no masses there.  ED Course   Critical Care  Date/Time: 4/16/2020 2:06 PM  Performed by: Pacheco Garcia MD  Authorized by: Pacheco Garcia MD   Direct patient critical care time: 22 minutes  Additional history critical care time: 11 minutes  Ordering / reviewing critical care time: 11  minutes  Documentation critical care time: 11 minutes  Consulting other physicians critical care time: 5 minutes  Total critical care time (exclusive of procedural time) : 60 minutes  Critical care time was exclusive of separately billable procedures and treating other patients and teaching time.  Critical care was necessary to treat or prevent imminent or life-threatening deterioration of the following conditions: circulatory failure.  Critical care was time spent personally by me on the following activities: discussions with consultants, evaluation of patient's response to treatment, obtaining history from patient or surrogate, ordering and review of laboratory studies, pulse oximetry, review of old charts, re-evaluation of patient's condition, ordering and review of radiographic studies, ordering and performing treatments and interventions, examination of patient, discussions with primary provider and development of treatment plan with patient or surrogate.        Labs Reviewed   CBC W/ AUTO DIFFERENTIAL - Abnormal; Notable for the following components:       Result Value    Hemoglobin 11.7 (*)     Hematocrit 36.5 (*)     Immature Granulocytes 0.8 (*)     Immature Grans (Abs) 0.07 (*)     Gran% 73.3 (*)     Lymph% 15.7 (*)     All other components within normal limits   COMPREHENSIVE METABOLIC PANEL - Abnormal; Notable for the following components:    Total Protein 8.6 (*)     All other components within normal limits   SARS-COV-2 RNA AMPLIFICATION, QUAL - Abnormal; Notable for the following components:    SARS-CoV-2 RNA, Amplification, Qual Positive (*)     All other components within normal limits    Narrative:     What symptom criteria does the patient meet?->Difficulty  breathing   LACTATE DEHYDROGENASE - Abnormal; Notable for the following components:     (*)     All other components within normal limits   C-REACTIVE PROTEIN - Abnormal; Notable for the following components:    CRP 67.3 (*)     All  other components within normal limits   APTT   PROTIME-INR   CK   FERRITIN   PROCALCITONIN   D DIMER, QUANTITATIVE   POCT URINE PREGNANCY     EKG Readings: (Independently Interpreted)   This patient is in a sinus tachycardia with a heart rate of 126.  The patient has nonspecific T-wave changes.  There are no significant ST segment changes.  There is no evidence of acute myocardial infarction or malignant arrhythmia.     ECG Results          EKG 12-lead (Final result)  Result time 04/16/20 16:58:48    Final result by Interface, Lab In [facility] (04/16/20 16:58:48)                 Narrative:    Test Reason : R00.0,    Vent. Rate : 126 BPM     Atrial Rate : 126 BPM     P-R Int : 112 ms          QRS Dur : 074 ms      QT Int : 302 ms       P-R-T Axes : 062 055 -10 degrees     QTc Int : 437 ms    Sinus tachycardia  T wave abnormality, consider inferior ischemia  Abnormal ECG  No previous ECGs available  Confirmed by Junior Edwards MD ([id number]) on 4/16/2020 4:58:38 PM    Referred By: [email or web address]   SELF           Confirmed By:Junior Edwards MD                            Imaging Results           US Lower Extremity Veins Left (Final result)  Result time 04/16/20 12:22:01    Final result by Ricardo Salas MD (04/16/20 12:22:01)                 Impression:      This report was flagged in Epic as abnormal.    Extensive deep venous thrombosis involving the left common femoral vein, femoral vein, greater saphenous vein, popliteal vein, as well as anterior and posterior tibial veins and peroneal vein.      Electronically signed by: Ricardo Salas MD  Date:    04/16/2020  Time:    12:22             Narrative:    EXAMINATION:  US LOWER EXTREMITY VEINS LEFT    CLINICAL HISTORY:  Pain in left leg    TECHNIQUE:  Duplex and color flow Doppler evaluation and graded compression of the left lower extremity veins was performed.    COMPARISON:  None    FINDINGS:  Duplex and color flow Doppler evaluation demonstrates thrombus within the left  common femoral vein extending to the proximal greater saphenous vein.  There is thrombus within the proximal, mid, and distal left femoral vein.  There is thrombus within the left popliteal vein, as well as the anterior and posterior tibial veins.  There is thrombosis of the left peroneal vein.                              Medical decision making:  Given the above, this patient presents to the emergency room with pain in the left lower extremity for 2 days.  He has a slight blue discoloration.  Ultrasound reveals extensive deep venous thrombosis.  I consulted vascular surgery.   will see the patient.    The patient was evaluated in the emergency room by vascular surgery.  The decision was made to send the patient to [facility] for a lysis of the thrombosis..  The patient has clot in her iliac vein.  She was sent back to ultrasound by .  I am arranging transfer to [facility] for clot lysis.    The decision was made to treat the patient here.  The patient was accepted by Dr.Amanda Ocasio.  Interventional Radiology was consult for clot lysis.   will take the patient to Interventional Radiology for clot lysis.  Jasper Pittman will write admission orders on this patient.    Patient is COVID positive.                                     Clinical Impression:       ICD-10-CM ICD-9-CM   1. Acute deep vein thrombosis (DVT) of distal vein of left lower extremity I82.4Z2 453.42   2. Acute pain of left lower extremity M79.605 729.5   3. Tachycardia R00.0 785.0   4. 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 Acute deep vein thrombosis (DVT) of iliac vein of left lower extremity I82.422 453.41   6. DVT (deep venous thrombosis) I82.409 453.40   7. Acute deep vein thrombosis of left iliac vein I82.422 453.41   8. COVID-19 virus detected U07.1              ED Disposition Condition    Admit            I personally performed the services described in this documentation.  All  medical record  entries made by the scribe are at my direction and in my presence.  Signed, Dr. Radha Garcia MD  04/16/20 1408       Pacheco Garcia MD  04/16/20 [id number]       Pacheco Garcia MD  04/16/20 3438

## 2020-04-16 NOTE — HPI
Tariq Pelaez MD VI                       [facility]                         2020    HPI:  Wayne Garces is a 24 y.o. female with There is no problem list on file for this patient.   being managed by PCP and specialists who is here today for evaluation of LLE pain.  Patient states location is LLE and L back occurring for 2 days.  Associated signs and symptoms include edema and .  Quality is aching and severity is 6/10.  Symptoms began 2 days ago, worsening yesterday and today.  Alleviating factors include elevation.  Worsening factors include dependency.  Denies FH of clotting disorder, personal history of DVT and venous interventions.  No birth control.  States she has been immobile for 2 weeks due to muscle aches.  No pregnancy currently or h/o spontaneous abortions.    no MI  no Stroke  Tobacco use: denies    History reviewed. No pertinent past medical history.  Past Surgical History:   Procedure Laterality Date     SECTION      TUBAL LIGATION       No family history on file.  Social History     Socioeconomic History    Marital status: Single     Spouse name: Not on file    Number of children: Not on file    Years of education: Not on file    Highest education level: Not on file   Occupational History    Not on file   Social Needs    Financial resource strain: Not on file    Food insecurity:     Worry: Not on file     Inability: Not on file    Transportation needs:     Medical: Not on file     Non-medical: Not on file   Tobacco Use    Smoking status: Never Smoker   Substance and Sexual Activity    Alcohol use: Never     Frequency: Never    Drug use: Never    Sexual activity: Not on file   Lifestyle    Physical activity:     Days per week: Not on file     Minutes per session: Not on file    Stress: Not on file   Relationships    Social connections:     Talks on phone: Not on file     Gets together: Not on file     Attends Buddhist  service: Not on file     Active member of club or organization: Not on file     Attends meetings of clubs or organizations: Not on file     Relationship status: Not on file   Other Topics Concern    Not on file   Social History Narrative    Not on file       Current Facility-Administered Medications:     heparin 25,000 units in dextrose 5% (100 units/ml) IV bolus from bag - ADDITIONAL PRN BOLUS - 30 units/kg, 30 Units/kg (Adjusted), Intravenous, PRN, Pacheco Garcia MD    heparin 25,000 units in dextrose 5% (100 units/ml) IV bolus from bag - ADDITIONAL PRN BOLUS - 60 units/kg, 60 Units/kg (Adjusted), Intravenous, PRN, Pacheco Garcia MD    heparin 25,000 units in dextrose 5% 250 mL (100 units/mL) infusion HIGH INTENSITY nomogram - [facility], 18 Units/kg/hr (Adjusted), Intravenous, Continuous, Pacheco Garcia MD, Last Rate: 13.1 mL/hr at 04/16/20 1401, 18 Units/kg/hr at 04/16/20 1401    Current Outpatient Medications:     acetaminophen (TYLENOL) 650 MG TbSR, Take 650 mg by mouth every 8 (eight) hours., Disp: , Rfl:

## 2020-04-16 NOTE — ED NOTES
Pt's family called with pt's permission for update on plan of care. Pt updated on planned transfer with surgery. Transfer consent signed.       Sister in law Carid [phone]

## 2020-04-17 ENCOUNTER — ANESTHESIA (OUTPATIENT)
Dept: ANESTHESIOLOGY | Facility: HOSPITAL | Age: 25
DRG: 982 | End: 2020-04-17
Payer: OTHER GOVERNMENT

## 2020-04-17 PROBLEM — I82.422 ACUTE DEEP VEIN THROMBOSIS (DVT) OF ILIAC VEIN OF LEFT LOWER EXTREMITY: Status: ACTIVE | Noted: 2020-04-17

## 2020-04-17 LAB
ALBUMIN SERPL BCP-MCNC: 3.8 G/DL (ref 3.5–5.2)
ALP SERPL-CCNC: 62 U/L (ref 55–135)
ALT SERPL W/O P-5'-P-CCNC: 27 U/L (ref 10–44)
ANION GAP SERPL CALC-SCNC: 11 MMOL/L (ref 8–16)
AORTIC ROOT ANNULUS: 2.21 CM
AORTIC VALVE CUSP SEPERATION: 2.18 CM
APTT BLDCRRT: 35.7 SEC (ref 21–32)
APTT BLDCRRT: 44.1 SEC (ref 21–32)
APTT BLDCRRT: >150 SEC (ref 21–32)
ASCENDING AORTA: 2.24 CM
AST SERPL-CCNC: 40 U/L (ref 10–40)
AV INDEX (PROSTH): 0.78
AV MEAN GRADIENT: 5 MMHG
AV PEAK GRADIENT: 9 MMHG
AV VALVE AREA: 1.98 CM2
AV VELOCITY RATIO: 0.69
BASOPHILS # BLD AUTO: 0.01 K/UL (ref 0–0.2)
BASOPHILS # BLD AUTO: 0.01 K/UL (ref 0–0.2)
BASOPHILS NFR BLD: 0.1 % (ref 0–1.9)
BASOPHILS NFR BLD: 0.1 % (ref 0–1.9)
BILIRUB SERPL-MCNC: 2.7 MG/DL (ref 0.1–1)
BSA FOR ECHO PROCEDURE: 2.1 M2
BUN SERPL-MCNC: 15 MG/DL (ref 6–20)
CALCIUM SERPL-MCNC: 9.1 MG/DL (ref 8.7–10.5)
CHLORIDE SERPL-SCNC: 107 MMOL/L (ref 95–110)
CK SERPL-CCNC: 102 U/L (ref 20–180)
CO2 SERPL-SCNC: 23 MMOL/L (ref 23–29)
CREAT SERPL-MCNC: 1.2 MG/DL (ref 0.5–1.4)
CRP SERPL-MCNC: 128.6 MG/L (ref 0–8.2)
CV ECHO LV RWT: 0.49 CM
D DIMER PPP IA.FEU-MCNC: NORMAL MG/L FEU
DIFFERENTIAL METHOD: ABNORMAL
DIFFERENTIAL METHOD: ABNORMAL
DOP CALC AO PEAK VEL: 1.49 M/S
DOP CALC AO VTI: 21.83 CM
DOP CALC LVOT AREA: 2.5 CM2
DOP CALC LVOT DIAMETER: 1.8 CM
DOP CALC LVOT PEAK VEL: 1.03 M/S
DOP CALC LVOT STROKE VOLUME: 43.24 CM3
DOP CALCLVOT PEAK VEL VTI: 17 CM
E WAVE DECELERATION TIME: 193.2 MSEC
E/A RATIO: 2.08
E/E' RATIO: 9.17 M/S
ECHO LV POSTERIOR WALL: 1.01 CM (ref 0.6–1.1)
EOSINOPHIL # BLD AUTO: 0.1 K/UL (ref 0–0.5)
EOSINOPHIL # BLD AUTO: 0.1 K/UL (ref 0–0.5)
EOSINOPHIL NFR BLD: 0.6 % (ref 0–8)
EOSINOPHIL NFR BLD: 0.8 % (ref 0–8)
ERYTHROCYTE [DISTWIDTH] IN BLOOD BY AUTOMATED COUNT: 12.6 % (ref 11.5–14.5)
ERYTHROCYTE [DISTWIDTH] IN BLOOD BY AUTOMATED COUNT: 12.6 % (ref 11.5–14.5)
EST. GFR  (AFRICAN AMERICAN): >60 ML/MIN/1.73 M^2
EST. GFR  (NON AFRICAN AMERICAN): >60 ML/MIN/1.73 M^2
FACT X PPP CHRO-ACNC: >1.5 IU/ML (ref 0.3–0.7)
FIBRINOGEN PPP-MCNC: 118 MG/DL (ref 182–366)
FIBRINOGEN PPP-MCNC: 128 MG/DL (ref 182–366)
FIBRINOGEN PPP-MCNC: 310 MG/DL (ref 182–366)
FRACTIONAL SHORTENING: 45 % (ref 28–44)
GLUCOSE SERPL-MCNC: 111 MG/DL (ref 70–110)
HCT VFR BLD AUTO: 29.7 % (ref 37–48.5)
HCT VFR BLD AUTO: 31.6 % (ref 37–48.5)
HGB BLD-MCNC: 10.1 G/DL (ref 12–16)
HGB BLD-MCNC: 8.9 G/DL (ref 12–16)
IMM GRANULOCYTES # BLD AUTO: 0.08 K/UL (ref 0–0.04)
IMM GRANULOCYTES # BLD AUTO: 0.09 K/UL (ref 0–0.04)
IMM GRANULOCYTES NFR BLD AUTO: 0.9 % (ref 0–0.5)
IMM GRANULOCYTES NFR BLD AUTO: 1 % (ref 0–0.5)
INR PPP: 1.1 (ref 0.8–1.2)
INR PPP: 1.3 (ref 0.8–1.2)
INR PPP: 1.3 (ref 0.8–1.2)
INTERVENTRICULAR SEPTUM: 1 CM (ref 0.6–1.1)
IVRT: 69.2 MSEC
LA MAJOR: 4.37 CM
LA MINOR: 3.83 CM
LA WIDTH: 2.47 CM
LEFT ATRIUM SIZE: 3.64 CM
LEFT ATRIUM VOLUME INDEX: 15.4 ML/M2
LEFT ATRIUM VOLUME: 31.2 CM3
LEFT INTERNAL DIMENSION IN SYSTOLE: 2.27 CM (ref 2.1–4)
LEFT VENTRICLE DIASTOLIC VOLUME INDEX: 37.16 ML/M2
LEFT VENTRICLE DIASTOLIC VOLUME: 75.05 ML
LEFT VENTRICLE MASS INDEX: 66 G/M2
LEFT VENTRICLE SYSTOLIC VOLUME INDEX: 8.7 ML/M2
LEFT VENTRICLE SYSTOLIC VOLUME: 17.56 ML
LEFT VENTRICULAR INTERNAL DIMENSION IN DIASTOLE: 4.12 CM (ref 3.5–6)
LEFT VENTRICULAR MASS: 134.06 G
LV LATERAL E/E' RATIO: 7.33 M/S
LV SEPTAL E/E' RATIO: 12.22 M/S
LYMPHOCYTES # BLD AUTO: 1 K/UL (ref 1–4.8)
LYMPHOCYTES # BLD AUTO: 1.4 K/UL (ref 1–4.8)
LYMPHOCYTES NFR BLD: 10.7 % (ref 18–48)
LYMPHOCYTES NFR BLD: 15.3 % (ref 18–48)
MCH RBC QN AUTO: 27.3 PG (ref 27–31)
MCH RBC QN AUTO: 27.6 PG (ref 27–31)
MCHC RBC AUTO-ENTMCNC: 30 G/DL (ref 32–36)
MCHC RBC AUTO-ENTMCNC: 32 G/DL (ref 32–36)
MCV RBC AUTO: 85 FL (ref 82–98)
MCV RBC AUTO: 92 FL (ref 82–98)
MONOCYTES # BLD AUTO: 0.8 K/UL (ref 0.3–1)
MONOCYTES # BLD AUTO: 0.9 K/UL (ref 0.3–1)
MONOCYTES NFR BLD: 9.3 % (ref 4–15)
MONOCYTES NFR BLD: 9.4 % (ref 4–15)
MV PEAK A VEL: 0.53 M/S
MV PEAK E VEL: 1.1 M/S
NEUTROPHILS # BLD AUTO: 6.9 K/UL (ref 1.8–7.7)
NEUTROPHILS # BLD AUTO: 7.1 K/UL (ref 1.8–7.7)
NEUTROPHILS NFR BLD: 73.6 % (ref 38–73)
NEUTROPHILS NFR BLD: 78.2 % (ref 38–73)
NRBC BLD-RTO: 0 /100 WBC
NRBC BLD-RTO: 0 /100 WBC
PISA TR MAX VEL: 2.6 M/S
PLATELET # BLD AUTO: 203 K/UL (ref 150–350)
PLATELET # BLD AUTO: 228 K/UL (ref 150–350)
PMV BLD AUTO: 11.2 FL (ref 9.2–12.9)
PMV BLD AUTO: 11.5 FL (ref 9.2–12.9)
POTASSIUM SERPL-SCNC: 4.2 MMOL/L (ref 3.5–5.1)
PROT SERPL-MCNC: 7.6 G/DL (ref 6–8.4)
PROTHROMBIN TIME: 11.9 SEC (ref 9–12.5)
PROTHROMBIN TIME: 13.9 SEC (ref 9–12.5)
PROTHROMBIN TIME: 14.3 SEC (ref 9–12.5)
PULM VEIN S/D RATIO: 0.92
PV PEAK D VEL: 0.53 M/S
PV PEAK S VEL: 0.49 M/S
PV PEAK VELOCITY: 1.2 CM/S
RA MAJOR: 4.85 CM
RA PRESSURE: 3 MMHG
RA WIDTH: 2.59 CM
RBC # BLD AUTO: 3.23 M/UL (ref 4–5.4)
RBC # BLD AUTO: 3.7 M/UL (ref 4–5.4)
RIGHT VENTRICULAR END-DIASTOLIC DIMENSION: 2.8 CM
RV TISSUE DOPPLER FREE WALL SYSTOLIC VELOCITY 1 (APICAL 4 CHAMBER VIEW): 17.78 CM/S
SINUS: 2.23 CM
SODIUM SERPL-SCNC: 141 MMOL/L (ref 136–145)
STJ: 1.67 CM
TDI LATERAL: 0.15 M/S
TDI SEPTAL: 0.09 M/S
TDI: 0.12 M/S
TR MAX PG: 27 MMHG
TRICUSPID ANNULAR PLANE SYSTOLIC EXCURSION: 2.73 CM
TV REST PULMONARY ARTERY PRESSURE: 30 MMHG
WBC # BLD AUTO: 9.07 K/UL (ref 3.9–12.7)
WBC # BLD AUTO: 9.33 K/UL (ref 3.9–12.7)

## 2020-04-17 PROCEDURE — 85730 THROMBOPLASTIN TIME PARTIAL: CPT

## 2020-04-17 PROCEDURE — 37000008 HC ANESTHESIA 1ST 15 MINUTES

## 2020-04-17 PROCEDURE — 85384 FIBRINOGEN ACTIVITY: CPT

## 2020-04-17 PROCEDURE — 85384 FIBRINOGEN ACTIVITY: CPT | Mod: 91

## 2020-04-17 PROCEDURE — 36415 COLL VENOUS BLD VENIPUNCTURE: CPT

## 2020-04-17 PROCEDURE — 63600175 PHARM REV CODE 636 W HCPCS: Performed by: NURSE ANESTHETIST, CERTIFIED REGISTERED

## 2020-04-17 PROCEDURE — 85610 PROTHROMBIN TIME: CPT | Mod: 91

## 2020-04-17 PROCEDURE — 63600175 PHARM REV CODE 636 W HCPCS: Performed by: RADIOLOGY

## 2020-04-17 PROCEDURE — 25500020 PHARM REV CODE 255: Performed by: INTERNAL MEDICINE

## 2020-04-17 PROCEDURE — 20000000 HC ICU ROOM

## 2020-04-17 PROCEDURE — D9220A PRA ANESTHESIA: ICD-10-PCS | Mod: ,,, | Performed by: ANESTHESIOLOGY

## 2020-04-17 PROCEDURE — 85610 PROTHROMBIN TIME: CPT

## 2020-04-17 PROCEDURE — 37000009 HC ANESTHESIA EA ADD 15 MINS

## 2020-04-17 PROCEDURE — 25000003 PHARM REV CODE 250: Performed by: NURSE ANESTHETIST, CERTIFIED REGISTERED

## 2020-04-17 PROCEDURE — D9220A PRA ANESTHESIA: Mod: ,,, | Performed by: ANESTHESIOLOGY

## 2020-04-17 PROCEDURE — 85730 THROMBOPLASTIN TIME PARTIAL: CPT | Mod: 91

## 2020-04-17 PROCEDURE — 80053 COMPREHEN METABOLIC PANEL: CPT

## 2020-04-17 PROCEDURE — 82550 ASSAY OF CK (CPK): CPT

## 2020-04-17 PROCEDURE — 94761 N-INVAS EAR/PLS OXIMETRY MLT: CPT

## 2020-04-17 PROCEDURE — 86140 C-REACTIVE PROTEIN: CPT

## 2020-04-17 PROCEDURE — 85025 COMPLETE CBC W/AUTO DIFF WBC: CPT | Mod: 91

## 2020-04-17 PROCEDURE — 25000003 PHARM REV CODE 250: Performed by: RADIOLOGY

## 2020-04-17 PROCEDURE — 85520 HEPARIN ASSAY: CPT

## 2020-04-17 RX ORDER — GLYCOPYRROLATE 0.2 MG/ML
INJECTION INTRAMUSCULAR; INTRAVENOUS
Status: DISCONTINUED | OUTPATIENT
Start: 2020-04-17 | End: 2020-04-17

## 2020-04-17 RX ORDER — HEPARIN SODIUM,PORCINE/D5W 25000/250
18 INTRAVENOUS SOLUTION INTRAVENOUS CONTINUOUS
Status: DISCONTINUED | OUTPATIENT
Start: 2020-04-17 | End: 2020-04-17

## 2020-04-17 RX ORDER — HEPARIN SODIUM 1000 [USP'U]/ML
INJECTION, SOLUTION INTRAVENOUS; SUBCUTANEOUS CODE/TRAUMA/SEDATION MEDICATION
Status: COMPLETED | OUTPATIENT
Start: 2020-04-17 | End: 2020-04-17

## 2020-04-17 RX ORDER — SODIUM CHLORIDE 9 MG/ML
INJECTION, SOLUTION INTRAVENOUS CONTINUOUS PRN
Status: DISCONTINUED | OUTPATIENT
Start: 2020-04-17 | End: 2020-04-17

## 2020-04-17 RX ORDER — FENTANYL CITRATE 50 UG/ML
INJECTION, SOLUTION INTRAMUSCULAR; INTRAVENOUS
Status: DISCONTINUED | OUTPATIENT
Start: 2020-04-17 | End: 2020-04-17

## 2020-04-17 RX ORDER — HEPARIN SODIUM,PORCINE/D5W 25000/250
18 INTRAVENOUS SOLUTION INTRAVENOUS CONTINUOUS
Status: DISCONTINUED | OUTPATIENT
Start: 2020-04-17 | End: 2020-04-20

## 2020-04-17 RX ORDER — KETAMINE HYDROCHLORIDE 100 MG/ML
INJECTION, SOLUTION INTRAMUSCULAR; INTRAVENOUS
Status: DISCONTINUED | OUTPATIENT
Start: 2020-04-17 | End: 2020-04-17

## 2020-04-17 RX ORDER — MIDAZOLAM HYDROCHLORIDE 1 MG/ML
INJECTION, SOLUTION INTRAMUSCULAR; INTRAVENOUS
Status: DISCONTINUED | OUTPATIENT
Start: 2020-04-17 | End: 2020-04-17

## 2020-04-17 RX ORDER — PROPOFOL 10 MG/ML
VIAL (ML) INTRAVENOUS
Status: DISCONTINUED | OUTPATIENT
Start: 2020-04-17 | End: 2020-04-17

## 2020-04-17 RX ORDER — FENTANYL CITRATE 50 UG/ML
25 INJECTION, SOLUTION INTRAMUSCULAR; INTRAVENOUS EVERY 4 HOURS PRN
Status: DISCONTINUED | OUTPATIENT
Start: 2020-04-17 | End: 2020-04-18

## 2020-04-17 RX ADMIN — KETAMINE HYDROCHLORIDE 10 MG: 100 INJECTION INTRAMUSCULAR; INTRAVENOUS at 05:04

## 2020-04-17 RX ADMIN — PROPOFOL 20 MG: 10 INJECTION, EMULSION INTRAVENOUS at 04:04

## 2020-04-17 RX ADMIN — PROPOFOL 10 MG: 10 INJECTION, EMULSION INTRAVENOUS at 05:04

## 2020-04-17 RX ADMIN — PROPOFOL 40 MG: 10 INJECTION, EMULSION INTRAVENOUS at 05:04

## 2020-04-17 RX ADMIN — KETAMINE HYDROCHLORIDE 10 MG: 100 INJECTION INTRAMUSCULAR; INTRAVENOUS at 03:04

## 2020-04-17 RX ADMIN — MIDAZOLAM HYDROCHLORIDE 2 MG: 1 INJECTION, SOLUTION INTRAMUSCULAR; INTRAVENOUS at 03:04

## 2020-04-17 RX ADMIN — FENTANYL CITRATE 50 MCG: 50 INJECTION INTRAMUSCULAR; INTRAVENOUS at 05:04

## 2020-04-17 RX ADMIN — Medication: at 07:04

## 2020-04-17 RX ADMIN — KETAMINE HYDROCHLORIDE 10 MG: 100 INJECTION INTRAMUSCULAR; INTRAVENOUS at 04:04

## 2020-04-17 RX ADMIN — FENTANYL CITRATE 25 MCG: 50 INJECTION INTRAMUSCULAR; INTRAVENOUS at 07:04

## 2020-04-17 RX ADMIN — MIDAZOLAM HYDROCHLORIDE 2 MG: 1 INJECTION, SOLUTION INTRAMUSCULAR; INTRAVENOUS at 06:04

## 2020-04-17 RX ADMIN — HEPARIN SODIUM 5000 UNITS: 1000 INJECTION, SOLUTION INTRAVENOUS; SUBCUTANEOUS at 04:04

## 2020-04-17 RX ADMIN — HEPARIN SODIUM 18 UNITS/KG/HR: 10000 INJECTION, SOLUTION INTRAVENOUS at 05:04

## 2020-04-17 RX ADMIN — FENTANYL CITRATE 50 MCG: 50 INJECTION INTRAMUSCULAR; INTRAVENOUS at 06:04

## 2020-04-17 RX ADMIN — PROPOFOL 10 MG: 10 INJECTION, EMULSION INTRAVENOUS at 04:04

## 2020-04-17 RX ADMIN — PROPOFOL 30 MG: 10 INJECTION, EMULSION INTRAVENOUS at 05:04

## 2020-04-17 RX ADMIN — GLYCOPYRROLATE 0.2 MG: 0.2 INJECTION, SOLUTION INTRAMUSCULAR; INTRAVENOUS at 03:04

## 2020-04-17 RX ADMIN — HEPARIN SODIUM 5000 UNITS: 1000 INJECTION, SOLUTION INTRAVENOUS; SUBCUTANEOUS at 05:04

## 2020-04-17 RX ADMIN — ALTEPLASE 0.5 MG/HR: 2.2 INJECTION, POWDER, LYOPHILIZED, FOR SOLUTION INTRAVENOUS at 12:04

## 2020-04-17 RX ADMIN — ALTEPLASE 1 MG/HR: 2.2 INJECTION, POWDER, LYOPHILIZED, FOR SOLUTION INTRAVENOUS at 02:04

## 2020-04-17 RX ADMIN — IOHEXOL 95 ML: 300 INJECTION, SOLUTION INTRAVENOUS at 06:04

## 2020-04-17 RX ADMIN — SODIUM CHLORIDE: 9 INJECTION, SOLUTION INTRAVENOUS at 03:04

## 2020-04-17 NOTE — TRANSFER OF CARE
"Anesthesia Transfer of Care Note    Patient: Wayne Garces    Procedure(s) Performed: Procedure(s) (LRB):  THROMBOLYSIS OR THROMBECTOMY, BLOOD VESSEL, LOWER EXTREMITY (Left)    Patient location: Other: (IR nurse)    Anesthesia Type: MAC    Transport from OR: Transported from OR on 2-3 L/min O2 by NC with adequate spontaneous ventilation    Post pain: adequate analgesia    Post assessment: no apparent anesthetic complications and tolerated procedure well    Post vital signs: stable    Level of consciousness: sedated and responds to stimulation    Nausea/Vomiting: no nausea/vomiting    Complications: none    Transfer of care protocol was followed      Last vitals:   Visit Vitals  BP (!) 148/76 (BP Location: Right arm, Patient Position: Lying)   Pulse (!) 128   Temp 37 °C (98.6 °F) (Skin)   Resp (!) 21   Ht 5' 4" (1.626 m)   Wt 97.8 kg (215 lb 9.8 oz)   LMP 04/14/2020   SpO2 100%   Breastfeeding? No   BMI 37.01 kg/m²     "

## 2020-04-17 NOTE — HOSPITAL COURSE
Ms Mcdonald presented with left lower extremity swelling secondary to extensive deep vein thrombus extending from left iliac vein down to posterior tibial veins. ED consulted vascular surgery recommending pharmaco-mechanical thrombectomy and initiating heparin drip in the interim pending tentative procedure. Per discussion between ED physician and vascular surgeon was to transfer to [facility] for procedure. Patient tested positive for COVID-19, which is potentially cause for acute thrombus. Seems that because of positive test, transfer was cancelled. IR consulted. Underwent 2 sessions of thrombectomy/thrombolysis. IR physician incidentally noted severe focal narrowing of the proximal left common iliac vein concerning for external venous compression. Underwent venoplasty without resolution of stenosis, therefore had stent placed. Stenosis successfully treated with this. Patient tolerated procedure very well and transferred back to ICU for close monitoring. was on heparin infusion with plans to transition to NOAC. IR physician also recommends aspirin 81 mg daily indefinitely for stent. Patient is uninsured and has no SSN. Patient assistance program application submitted to assist with cost of NOAC which she needs for at least 3-6 months. Stepped down to floor 4/18,  Patient remains  stable on RA.ddi well with PT,OT.  [facility] is consulted for Xarelto arrangement.  Remains afebrile.her pain is improved,SW arranged Xarelto,follow up with [facility] is arranged,patient was discharged home with follow up, plan with [facility].

## 2020-04-17 NOTE — NURSING
Results for TANYA MANZO (MRN [id number]) as of 4/17/2020 04:44   Ref. Range 4/16/2020 23:00   Fibrinogen Latest Ref Range: 182 - 366 mg/dL 310       Results for TANYA MANZO (MRN [id number]) as of 4/17/2020 04:44   Ref. Range 4/17/2020 03:15   INR Latest Ref Range: 0.8 - 1.2  1.3 (H)   aPTT Latest Ref Range: 21.0 - 32.0 sec 44.1 (H)   Fibrinogen Latest Ref Range: 182 - 366 mg/dL 128 (L)       Return page received from IR Resident on call, Dr. Santillan. Dr. Santillan updated on pt's ultrasound results, IR procedure, tPA + Heparin infusing to left ankle and labs as above. Dr. Santillan aware of  greater than 50% drop in fibrinogen. VSS. Drop in fibrinogen expected per Dr. Santillan. No new orders at this time. Will continue to monitor.

## 2020-04-17 NOTE — BRIEF OP NOTE
Radiology Post-Procedure Note    Pre Op Diagnosis: Acute, occlusive extensive LLE iliofemoral and tibial DVT  Post Op Diagnosis: Acute-to-subacute, occlusive extensive LLE iliofemoral and tibial DVT    Procedure: 1. Lt iliocaval and LLE venograms  2. Rheolytic pharmaco-mechanical thrombectomy/thrombolysis  3. Lt femorotibial 5-Fr unifuse infusion catheter for overnight thrombolysis    Procedure performed by: Darin Boo MD    Written Informed Consent Obtained: Yes  Specimen Removed: NO  Estimated Blood Loss: less than 100-cc     Findings:   Occlusive Lt iliofemoropopliteal DVT with portions of the femoral and distal tibial components mobile/free-floating and at risk of thromboembolic phenomenon. Difficulty in advancing catheter through or clearing thrombus from the iliac and proximal femoral segments suggest subacute nature of DVT. Partially occlusive Lt tibial DVT with consistency of acute thrombus.     Near complete clearance of Lt infra-popliteal/tibial DVT s/p thrombectomy/thrombolysis however, only parital clearance of Lt iliofemoropopliteal DVT.     5-Fr, 50-cm Unifuse infusion catheter extending from LtEIV to LtPopV left in place for overnight lytic infusion with plans for f/u imaging and intervention after 17 hours thrombolysis.     Patient tolerated the procedure however, with moderate pain; PCA pump ordered as further significant discomfort is expected overnight. No immediate post-procedural complications noted.     Thank you for considering IR for the care of your patient.     Darin Boo MD  Interventional Radiology

## 2020-04-17 NOTE — NURSING
Results for TANYA MANZO (MRN [id number]) as of 4/17/2020 07:28   Ref. Range 4/16/2020 23:00 4/17/2020 03:15   Fibrinogen Latest Ref Range: 182 - 366 mg/dL 310 128 (L)     Return page received from Dr. Boo. MD informed of  >50% decrease in fibrinogen, and results as above. Orders noted to decrease tPA infusion to .5mg/hr. MD states will be in within an hour to see patient. Charissa VÁSQUEZ, day shift RN informed of new orders.

## 2020-04-17 NOTE — ANESTHESIA PREPROCEDURE EVALUATION
2020  Wayne Garces is a 24 y.o., female.    Pre-operative evaluation for Procedure(s) (LRB):  THROMBOLYSIS OR THROMBECTOMY, BLOOD VESSEL, LOWER EXTREMITY (Left)    Wayne Garces is a 24 y.o. female     Denies CP/SOB/GERD/MI/CVA/URI symptoms.  Paper consent in chart.  METS > 4  NPO > 8    Patient Active Problem List   Diagnosis    Acute deep vein thrombosis (DVT) of femoral vein of left lower extremity    COVID-19    Suspected Covid-19 Virus Infection    Obesity (BMI 35.0-39.9 without comorbidity)    Acute deep vein thrombosis (DVT) of distal vein of left lower extremity        Review of patient's allergies indicates:  No Known Allergies    No current facility-administered medications on file prior to encounter.      Current Outpatient Medications on File Prior to Encounter   Medication Sig Dispense Refill    acetaminophen (TYLENOL) 650 MG TbSR Take 650 mg by mouth every 8 (eight) hours.         Past Surgical History:   Procedure Laterality Date     SECTION      TUBAL LIGATION         Social History     Socioeconomic History    Marital status: Single     Spouse name: Not on file    Number of children: Not on file    Years of education: Not on file    Highest education level: Not on file   Occupational History    Not on file   Social Needs    Financial resource strain: Not on file    Food insecurity:     Worry: Not on file     Inability: Not on file    Transportation needs:     Medical: Not on file     Non-medical: Not on file   Tobacco Use    Smoking status: Never Smoker   Substance and Sexual Activity    Alcohol use: Never     Frequency: Never    Drug use: Never    Sexual activity: Not on file   Lifestyle    Physical activity:     Days per week: Not on file     Minutes per session: Not on file    Stress: Not on file   Relationships    Social  connections:     Talks on phone: Not on file     Gets together: Not on file     Attends Rastafarian service: Not on file     Active member of club or organization: Not on file     Attends meetings of clubs or organizations: Not on file     Relationship status: Not on file   Other Topics Concern    Not on file   Social History Narrative    Not on file         CBC:   Recent Labs     04/16/20  2300 04/17/20  0315   WBC 8.97 9.07   RBC 3.58* 3.70*   HGB 9.9* 10.1*   HCT 30.8* 31.6*    228   MCV 86 85   MCH 27.7 27.3   MCHC 32.1 32.0       CMP:   Recent Labs     04/16/20  1038 04/17/20  0315    141   K 4.5 4.2    107   CO2 23 23   BUN 9 15   CREATININE 0.9 1.2    111*   CALCIUM 10.0 9.1   ALBUMIN 4.4 3.8   PROT 8.6* 7.6   ALKPHOS 67 62   ALT 35 27   AST 26 40   BILITOT 0.8 2.7*       INR  Recent Labs     04/16/20  1038 04/16/20 2300 04/17/20  0315   INR 1.0 1.1 1.3*   APTT 29.6 35.7* 44.1*         Vitals:    04/17/20 1245   BP: 136/76   Pulse: 102   Resp: 20   Temp:      See nursing charting for additional vital signs      Diagnostic Studies:  Results for OCTAVIO RUGGIERO BANDARRONNIE JUSTIN (MRN [id number]) as of 4/17/2020 13:33   Ref. Range 4/17/2020 03:15   WBC Latest Ref Range: 3.90 - 12.70 K/uL 9.07   RBC Latest Ref Range: 4.00 - 5.40 M/uL 3.70 (L)   Hemoglobin Latest Ref Range: 12.0 - 16.0 g/dL 10.1 (L)   Hematocrit Latest Ref Range: 37.0 - 48.5 % 31.6 (L)   MCV Latest Ref Range: 82 - 98 fL 85   MCH Latest Ref Range: 27.0 - 31.0 pg 27.3   MCHC Latest Ref Range: 32.0 - 36.0 g/dL 32.0   RDW Latest Ref Range: 11.5 - 14.5 % 12.6   Platelets Latest Ref Range: 150 - 350 K/uL 228   MPV Latest Ref Range: 9.2 - 12.9 fL 11.2   Gran% Latest Ref Range: 38.0 - 73.0 % 78.2 (H)   Gran # (ANC) Latest Ref Range: 1.8 - 7.7 K/uL 7.1   Lymph% Latest Ref Range: 18.0 - 48.0 % 10.7 (L)   Lymph # Latest Ref Range: 1.0 - 4.8 K/uL 1.0   Mono% Latest Ref Range: 4.0 - 15.0 % 9.3   Mono # Latest Ref Range: 0.3 - 1.0 K/uL 0.8    Eosinophil% Latest Ref Range: 0.0 - 8.0 % 0.8   Eos # Latest Ref Range: 0.0 - 0.5 K/uL 0.1   Basophil% Latest Ref Range: 0.0 - 1.9 % 0.1   Baso # Latest Ref Range: 0.00 - 0.20 K/uL 0.01   nRBC Latest Ref Range: 0 /100 WBC 0   Differential Method Unknown Automated   Immature Grans (Abs) Latest Ref Range: 0.00 - 0.04 K/uL 0.08 (H)   Immature Granulocytes Latest Ref Range: 0.0 - 0.5 % 0.9 (H)   Protime Latest Ref Range: 9.0 - 12.5 sec 13.9 (H)   INR Latest Ref Range: 0.8 - 1.2  1.3 (H)   aPTT Latest Ref Range: 21.0 - 32.0 sec 44.1 (H)   Fibrinogen Latest Ref Range: 182 - 366 mg/dL 128 (L)   Sodium Latest Ref Range: 136 - 145 mmol/L 141   Potassium Latest Ref Range: 3.5 - 5.1 mmol/L 4.2   Chloride Latest Ref Range: 95 - 110 mmol/L 107   CO2 Latest Ref Range: 23 - 29 mmol/L 23   Anion Gap Latest Ref Range: 8 - 16 mmol/L 11   BUN, Bld Latest Ref Range: 6 - 20 mg/dL 15   Creatinine Latest Ref Range: 0.5 - 1.4 mg/dL 1.2   eGFR if non African American Latest Ref Range: >60 mL/min/1.73 m^2 >60   eGFR if  Latest Ref Range: >60 mL/min/1.73 m^2 >60   Glucose Latest Ref Range: 70 - 110 mg/dL 111 (H)   Calcium Latest Ref Range: 8.7 - 10.5 mg/dL 9.1   Alkaline Phosphatase Latest Ref Range: 55 - 135 U/L 62   PROTEIN TOTAL Latest Ref Range: 6.0 - 8.4 g/dL 7.6   Albumin Latest Ref Range: 3.5 - 5.2 g/dL 3.8   BILIRUBIN TOTAL Latest Ref Range: 0.1 - 1.0 mg/dL 2.7 (H)   AST Latest Ref Range: 10 - 40 U/L 40   ALT Latest Ref Range: 10 - 44 U/L 27   CRP Latest Ref Range: 0.0 - 8.2 mg/L 128.6 (H)   CPK Latest Ref Range: 20 - 180 U/L 102     EKG (4/16/20):  Sinus tachycardia  T wave abnormality, consider inferior ischemia  Abnormal ECG    Anesthesia Evaluation    I have reviewed the Patient Summary Reports.    I have reviewed the Nursing Notes.      Review of Systems  Anesthesia Hx:  No problems with previous Anesthesia   Denies Personal Hx of Anesthesia complications.   Social:  No Alcohol Use, Non-Smoker     Hematology/Oncology:         -- Anemia:   Cardiovascular:   Exercise tolerance: good ECG has been reviewed. Acute DVT   Pulmonary:  Pulmonary Normal    Hepatic/GI:  Hepatic/GI Normal        Physical Exam  General:  Obesity    Airway/Jaw/Neck:   MP2, TMD > 3FB, Teeth intact     Chest/Lungs:  Chest/Lungs Clear    Heart/Vascular:  Heart Findings: Normal            Anesthesia Plan  Type of Anesthesia, risks & benefits discussed:  Anesthesia Type:  general, MAC  Patient's Preference:   Intra-op Monitoring Plan: standard ASA monitors  Intra-op Monitoring Plan Comments:   Post Op Pain Control Plan: multimodal analgesia, IV/PO Opioids PRN and per primary service following discharge from PACU  Post Op Pain Control Plan Comments:   Induction:   IV  Beta Blocker:  Patient is not currently on a Beta-Blocker (No further documentation required).       Informed Consent: Patient understands risks and agrees with Anesthesia plan.  Questions answered. Anesthesia consent signed with patient.  ASA Score: 2     Day of Surgery Review of History & Physical: I have interviewed and examined the patient. I have reviewed the patient's H&P dated:            Ready For Surgery From Anesthesia Perspective.

## 2020-04-17 NOTE — H&P
[facility] Hospital Medicine  History & Physical    Patient Name: Wayne Garces  MRN: [id number]  Admission Date: 2020  Attending Physician: Jayleen Gotti MD   Primary Care Provider: Primary Doctor No         Patient information was obtained from patient, past medical records and ER records.     Subjective:     Principal Problem:Acute deep vein thrombosis (DVT) of iliac vein of left lower extremity    Chief Complaint:   Chief Complaint   Patient presents with    Leg Pain     Patient reports left leg pain and swelling that started on Monday, worsened on yesterday. Denies any known injury/trauma to the area. Reports taking Tylenol for the pain, last taken at approx 2100 yesterday.         HPI: This is 24 y.o female with no medical history who presents to the hospital complaint of left leg pain for 4 days and progressively worsening for the last 2 days. Patient reports pain starting at her left thigh and radiating throughout the leg for the last 2 days. Associated symptoms are redness and swelling of left upper leg. Pain described as continuous aching 10/10 during the interview. She reports pain getting better if she raised her leg for the first 2 days but now nothing can help. She tried to take Tylenol at home but Tylenol doesn't help the leg pain just helps with her headache only. She finds she has descreased range of motion of the knee secondary to pain but denies any numbness in the leg. She reports she went to the hospital on 3/31/2020 because of fever, cough and body ache but sent home without Covid 19 testing. She has not been ambulating much at home secondary to body aches and fatigue. Denies any SOB, fever, chest pain, abdominal pain, n/v/d. No history of clotting disorder or DVT. Not on birth control pills. Denies smoking. Surgical history includes  section and tubal ligation per report.    In ED, US LLE vein shows extensive deep venous thrombosis involving  the left common femoral vein, femoral vein, greater saphenous vein, popliteal vein, as well as anterior and posterior tibial veins and peroneal vein. EKG with sinus tachycardia 126 H/H 11.7/36.5; coagulation studies normal; Covid-19 positive.    History obtained via language line.    History reviewed. No pertinent past medical history.    Past Surgical History:   Procedure Laterality Date     SECTION      TUBAL LIGATION         Review of patient's allergies indicates:  No Known Allergies    No current facility-administered medications on file prior to encounter.      Current Outpatient Medications on File Prior to Encounter   Medication Sig    acetaminophen (TYLENOL) 650 MG TbSR Take 650 mg by mouth every 8 (eight) hours.     Family History     None        Tobacco Use    Smoking status: Never Smoker   Substance and Sexual Activity    Alcohol use: Never     Frequency: Never    Drug use: Never    Sexual activity: Not on file     Review of Systems   Constitutional: Positive for activity change and fatigue. Negative for appetite change, chills, diaphoresis and fever.   HENT: Positive for congestion. Negative for sore throat.    Eyes: Negative for pain and visual disturbance.   Respiratory: Positive for cough (mild dry cough). Negative for apnea, chest tightness, shortness of breath and wheezing.    Cardiovascular: Positive for leg swelling (Left leg painful and swelling x 2 days). Negative for chest pain and palpitations.   Gastrointestinal: Negative for abdominal distention, abdominal pain, blood in stool, diarrhea, nausea and vomiting.   Genitourinary: Negative for dysuria, flank pain and hematuria.   Musculoskeletal: Positive for myalgias. Negative for neck pain and neck stiffness.   Skin: Positive for color change (left leg).   Neurological: Positive for headaches. Negative for dizziness, tremors, seizures and numbness.     Objective:     Vital Signs (Most Recent):  Temp: 99.5 °F (37.5 °C) (20  1649)  Pulse: 100 (04/16/20 1649)  Resp: 18 (04/16/20 1649)  BP: (!) 173/94 (04/16/20 1647)  SpO2: 98 % (04/16/20 1649) Vital Signs (24h Range):  Temp:  [99 °F (37.2 °C)-99.5 °F (37.5 °C)] 99.5 °F (37.5 °C)  Pulse:  [] 100  Resp:  [18-28] 18  SpO2:  [97 %-100 %] 98 %  BP: (135-173)/(73-94) 173/94     Weight: 99.8 kg (220 lb)  Body mass index is 37.76 kg/m².    Physical Exam   Constitutional: She appears well-developed and well-nourished. No distress.   HENT:   Head: Normocephalic and atraumatic.   Eyes: Pupils are equal, round, and reactive to light. Conjunctivae are normal.   Neck: Normal range of motion. Neck supple. No JVD present.   Cardiovascular: Regular rhythm and normal heart sounds.      Pulmonary/Chest: Effort normal and breath sounds normal. No respiratory distress. She has no wheezes. She has no rales. She exhibits no tenderness.   Abdominal: Soft. Bowel sounds are normal. She exhibits no distension. There is no tenderness. There is no guarding.   Musculoskeletal: She exhibits edema and tenderness (refused to move left leg due to pain).   Very tenderness with palpation at left inguinal area/upper thigh. swelling/redness left leg. Jeronimo DP/PT pulses palpable. Unable to fully flex knee secondary to pain   Neurological: She is alert. No sensory deficit.   Skin: She is not diaphoretic.         CRANIAL NERVES     CN III, IV, VI   Pupils are equal, round, and reactive to light.       Significant Labs:   CBC:   Recent Labs   Lab 04/16/20  1038   WBC 8.96   HGB 11.7*   HCT 36.5*        CMP:   Recent Labs   Lab 04/16/20  1038      K 4.5      CO2 23      BUN 9   CREATININE 0.9   CALCIUM 10.0   PROT 8.6*   ALBUMIN 4.4   BILITOT 0.8   ALKPHOS 67   AST 26   ALT 35   ANIONGAP 11   EGFRNONAA >60     Coagulation:   Recent Labs   Lab 04/16/20  1038   INR 1.0   APTT 29.6       Significant Imaging: I have reviewed and interpreted all pertinent imaging results/findings within the past 24  hours.    Assessment/Plan:     * Acute deep vein thrombosis (DVT) of femoral vein of left lower extremity  Pain at left leg x 4 days. Worsen pain , cyanosis and edema x 2 days. US Vein LLE shows acute L iliofemoral, popliteal and tibial DVT. S/P lysis with IR today  -IR/Vascular consulted  -Continue TPA infusion in the ICU  -echo pending  -neurovascular checks  -bed rest    Acute deep vein thrombosis (DVT) of distal vein of left lower extremity  See above    Obesity (BMI 35.0-39.9 without comorbidity)  Recommend diet and lifestyle modifications outpatient    COVID-19  Covid positive today. Possibly contributory to DVT given risk for hyper coaguable state.   D-dimer, LHD, CPK, procal, ferrtin, chest x-ray pending.   -Avoid BIPAP/CPAP/Nebulizers unless in negative pressure room  -Droplet/Contact/Airborne isolation  -Wishes to be full code  -Supplemental oxygen as needed  -Will start antibiotics pending procal results    VTE Risk Mitigation (From admission, onward)         Ordered     heparin 25,000 units in dextrose 5% 250 mL (100 units/mL) infusion (heparin infusion - NO NOMOGRAM)  Continuous      04/16/20 1935     IP VTE HIGH RISK PATIENT  Once      04/16/20 1951     heparin 25,000 units in dextrose 5% 250 mL (100 units/mL) infusion HIGH INTENSITY nomogram - [facility]  Continuous     Question:  Heparin Infusion Adjustment (DO NOT MODIFY ANSWER)  Answer:  \[email or web address]\Images\Pharmacy\HeparinInfusions\heparin HIGH INTENSITY nomogram for [facility] [id number]    04/16/20 1331     heparin 25,000 units in dextrose 5% (100 units/ml) IV bolus from bag - ADDITIONAL PRN BOLUS - 60 units/kg  As needed (PRN)     Question:  Heparin Infusion Adjustment (DO NOT MODIFY ANSWER)  Answer:  \[email or web address]\epic\Images\Pharmacy\HeparinInfusions\heparin HIGH INTENSITY nomogram for [facility] [id number].pdf    04/16/20 1331     heparin 25,000 units in dextrose 5% (100 units/ml) IV bolus from bag - ADDITIONAL PRN BOLUS - 30 units/kg  As needed (PRN)      Question:  Heparin Infusion Adjustment (DO NOT MODIFY ANSWER)  Answer:  [email or web address]\epic\Images\Pharmacy\HeparinInfusions\heparin HIGH INTENSITY nomogram for [facility] [id number]    04/16/20 1331              VTE: on TPA/heparin  Code: full  Diet: NPO  Dispo: pending further IR/Vascular recommendation  Patient will be admitted to inpatient with hospital medicine.    Jasper Pittman PA-C  Department of Hospital Medicine   [facility]

## 2020-04-17 NOTE — SEDATION DOCUMENTATION
Report called to GANGA Hammonds in ICU. Thrombectomy to LLE performed with interventions per MD. Tolerated well. Sheath and infusion catheter placed in posterior tibial vein and remains in place. Heparin infusing to sheath and tpa infusing to catheter. Site with steristrips and tegaderm, CDI, no redness, swelling or hematoma noted. VSS. NADN. Plan for pt to return to IR tomorrow for follow up. Transported to ICU per RN and MD.

## 2020-04-17 NOTE — NURSING
[id number] Voicemail left for Dr. Boo ([phone]) regarding pt's >50% decrease in fibrinogen. Awaiting return call.    [phone] Call placed to [phone] to reach IR doctor on call. Paged IR Resident at [phone] to [phone]. Awaitng return call.

## 2020-04-17 NOTE — PROGRESS NOTES
Pt, IR team and anesthesia team in procedure room. Pt placed prone on procedure table. Anesthesia to administer sedation and monitor/chart vitals throughout procedure. IR RN in room to assist team as needed.

## 2020-04-17 NOTE — PROGRESS NOTES
Inpatient Radiology Pre-procedure Note    History of Present Illness:  Wayne Garces is a 24 y.o. female with acute to subacute left iliac and LLE extensive, occlusive DVT s/p thrombolysis, aspiration thrombectomy and over night lytic infusion therapy.     NAEON. Pts pain is improved, currently 2/10, but waxes/wanes with periods of up to 8/10 pain. LLE still asymmetrically cool to touch however, pedal pulsed are now palpable and +2 likely 2/2 to improved LLE edema and venous flow through LLE.     Fibrinogen level has decreased to 128 overnight with lytic therapy contraindicating the potential for further overnight thrombolytic therapy. Therefore, will have to proceed with f/u imaging and completion of intervention today. Given the severe pain pt experienced with less aggressive intervention yesterday and likely need for more aggressive intervention today, anesthesia support and likely GA will be required.     Plan for venogram and further intervention with possible iliac stent placement of suspected May-thurner lesion assuming we are successful in achieving good innline flow from tibial to iliac veins. D/w anesthesia who advise they will not be available until this afternoon.     Admission H&P reviewed.  History reviewed. No pertinent past medical history.  Past Surgical History:   Procedure Laterality Date     SECTION      TUBAL LIGATION       Review of Systems:   As documented in primary team H&P    Home Meds:   Prior to Admission medications    Medication Sig Start Date End Date Taking? Authorizing Provider   acetaminophen (TYLENOL) 650 MG TbSR Take 650 mg by mouth every 8 (eight) hours.   Yes Historical Provider, MD     Scheduled Meds:    alteplase (ACTIVASE) 10 mg in 0.9% NaCl 100 mL  10 mg Intra-Catheter Once    alteplase  4 mg Intra-Catheter Once     Continuous Infusions:    heparin (porcine) in 5 % dex 500 Units/hr (20 5176)    hydromorphone in 0.9 % NaCl 6 mg/30 ml        PRN Meds:fentaNYL, naloxone, sodium chloride 0.9%     Anticoagulants/Antiplatelets: no anticoagulation    Allergies: Review of patient's allergies indicates:  No Known Allergies     Sedation Hx: have not been any systemic reactions    Labs:  Recent Labs   Lab 04/17/20 0315   INR 1.3*       Recent Labs   Lab 04/17/20 0315   WBC 9.07   HGB 10.1*   HCT 31.6*   MCV 85         Recent Labs   Lab 04/17/20 0315   *      K 4.2      CO2 23   BUN 15   CREATININE 1.2   CALCIUM 9.1   ALT 27   AST 40   ALBUMIN 3.8   BILITOT 2.7*     Vitals:  Temp: 98.6 °F (37 °C) (04/17/20 1100)  Pulse: 89 (04/17/20 1345)  Resp: 14 (04/17/20 1345)  BP: (!) 119/59 (04/17/20 1345)  SpO2: 98 % (04/17/20 1345)     Physical Exam:  General: no acute distress  Mental Status: alert and oriented to person, place and time  HEENT: normocephalic, atraumatic  Chest: unlabored breathing  Heart: regular heart rate  Abdomen: nondistended  Extremity: moves all extremities. +2 pedal pulses bilaterally. LLE asymmetrically cool to touch but stable.    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 Of note, pt experienced severe pain with intervention and could not tolerate completion of procedure.  Pt has improved pedal pulses, decrease edema and improved level of pain however, cannot continue with overnight lytic infusion 2/2 marked drop in fibrinogen levels.     1. Lt iliac to tibial occlusive DVT - Plan for repeat venogram to assess for degree/extent of residual thrombus, likely further rheolytic and pharmaco-mechanical thormbolysis and thrombectomy, venoplasty and, if good flow achieved throughout the LLE veins, likely stenting of suspected severe common iliac vein narrowing.     Plan for anesthesia support with  potential need for GA given pt severe pain with less aggressive intervention and likely need for aggressive intervention for completion of procedure.     Post-op pt will require Heparin gtt at 18mg/kg/hr with transition to OAC which will be required for approximately 6 months and, HENYR for approximately 2 months.     Thank you for considering IR for the care of your patient.     Darin Boo MD  Interventional Radiology

## 2020-04-17 NOTE — PROGRESS NOTES
Report called to GANGA Carrington in ICU. Declot completed with clots removed per MD. Stent placed to L iliac vein. Sheath to L PTV removed, sutured with gauze and tegaderm. L popliteal site sutured with gauze and tegaderm. Sites CDI, no redness, swelling or hematoma noted. Heparin gtt infusing per MD order. Labs to be drawn once in ICU. VSS. TIFFANY. Transported to ICU per RN.

## 2020-04-17 NOTE — CARE UPDATE
[facility]  ICU Multidisciplinary Bedside Rounds   SUMMARY     Date: 4/17/2020    Prehospitalization: Home  Admit Date / LOS : 4/16/2020/ 1 days    Diagnosis: Acute deep vein thrombosis (DVT) of femoral vein of left lower extremity    Consults:        Active: IR       Needed: N/A     Code Status: Full Code   Advanced Directive: <no information>    LDA: Lester and PIV, Sheath       Central Lines/Site/Justification:Patient Does Not Have Central Line       Urinary Cath/Order/Justification:Required Immobilization    Vasopressors/Infusions:    alteplase 8 mg in 0.9% NaCl 80 mL (CAR/VAS use only) 1 mg/hr (04/17/20 0249)    heparin (porcine) in 5 % dex 200 Units/hr (04/16/20 1945)    hydromorphone in 0.9 % NaCl 6 mg/30 ml            GOALS: Volume/ Hemodynamic: N/A                     RASS: N/A    CAM ICU: Negative  Pain Management: IV       Pain Controlled: yes    Rhythm: NSR    Respiratory Device: Nasal Cannula        VTE Prophylaxis: Pharm  Mobility: Bedrest  Stress Ulcer Prophylaxis: No    Dietary: NPO  Tolerance: yes  /  Advancement: no    Isolation: Airborne and Contact and Droplet    Restraints: No    Significant Dates:  Post Op Date: N/A  Rescue Date: N/A  Imaging/ Diagnostics: U/S     Noteworthy Labs:  Fibrinogen    CBC/Anemia Labs: Coags:    Recent Labs   Lab 04/16/20  1345 04/16/20 2300 04/17/20 0315   WBC  --  8.97 9.07   HGB  --  9.9* 10.1*   HCT  --  30.8* 31.6*   PLT  --  252 228   MCV  --  86 85   RDW  --  12.5 12.6   FERRITIN 233  --   --     Recent Labs   Lab 04/16/20  2300 04/17/20  0315   INR 1.1 1.3*   APTT 35.7* 44.1*        Chemistries:   Recent Labs   Lab 04/16/20  1038 04/17/20  0315    141   K 4.5 4.2    107   CO2 23 23   BUN 9 15   CREATININE 0.9 1.2   CALCIUM 10.0 9.1   PROT 8.6* 7.6   BILITOT 0.8 2.7*   ALKPHOS 67 62   ALT 35 27   AST 26 40        Cardiac Enzymes: Ejection Fractions:    Recent Labs     04/16/20  1345 04/17/20  0315    102    No results  found for: EF     POCT Glucose: HbA1c:    No results for input(s): POCTGLUCOSE in the last 168 hours. No results found for: HGBA1C     Needs from Care Team: Pt with few complaints of pain  after PCA initiated, Pt. appeared to rest comfortably for several hours typically describing pain as none or little. MARTTI used for communication. All pulses palpable. L ankle site remains clean, dry, and intact with no evidence of swelling or drainage. tPA and Hepain infusing as ordered.     ICU LOS 11h  Level of Care: Critical Care

## 2020-04-18 LAB
APTT BLDCRRT: 60 SEC (ref 21–32)
APTT BLDCRRT: 60.9 SEC (ref 21–32)
BASOPHILS # BLD AUTO: 0.01 K/UL (ref 0–0.2)
BASOPHILS NFR BLD: 0.1 % (ref 0–1.9)
CRP SERPL-MCNC: 247 MG/L (ref 0–8.2)
DIFFERENTIAL METHOD: ABNORMAL
EOSINOPHIL # BLD AUTO: 0 K/UL (ref 0–0.5)
EOSINOPHIL NFR BLD: 0.4 % (ref 0–8)
ERYTHROCYTE [DISTWIDTH] IN BLOOD BY AUTOMATED COUNT: 12.8 % (ref 11.5–14.5)
HCT VFR BLD AUTO: 30.5 % (ref 37–48.5)
HGB BLD-MCNC: 9.5 G/DL (ref 12–16)
IMM GRANULOCYTES # BLD AUTO: 0.07 K/UL (ref 0–0.04)
IMM GRANULOCYTES NFR BLD AUTO: 0.7 % (ref 0–0.5)
LYMPHOCYTES # BLD AUTO: 1 K/UL (ref 1–4.8)
LYMPHOCYTES NFR BLD: 9.3 % (ref 18–48)
MCH RBC QN AUTO: 27.9 PG (ref 27–31)
MCHC RBC AUTO-ENTMCNC: 31.1 G/DL (ref 32–36)
MCV RBC AUTO: 89 FL (ref 82–98)
MONOCYTES # BLD AUTO: 0.9 K/UL (ref 0.3–1)
MONOCYTES NFR BLD: 8.3 % (ref 4–15)
NEUTROPHILS # BLD AUTO: 8.7 K/UL (ref 1.8–7.7)
NEUTROPHILS NFR BLD: 81.2 % (ref 38–73)
NRBC BLD-RTO: 0 /100 WBC
PLATELET # BLD AUTO: 191 K/UL (ref 150–350)
PMV BLD AUTO: 11.4 FL (ref 9.2–12.9)
RBC # BLD AUTO: 3.41 M/UL (ref 4–5.4)
WBC # BLD AUTO: 10.7 K/UL (ref 3.9–12.7)

## 2020-04-18 PROCEDURE — 25000003 PHARM REV CODE 250: Performed by: INTERNAL MEDICINE

## 2020-04-18 PROCEDURE — 36415 COLL VENOUS BLD VENIPUNCTURE: CPT

## 2020-04-18 PROCEDURE — 63600175 PHARM REV CODE 636 W HCPCS: Performed by: RADIOLOGY

## 2020-04-18 PROCEDURE — 86140 C-REACTIVE PROTEIN: CPT

## 2020-04-18 PROCEDURE — 85730 THROMBOPLASTIN TIME PARTIAL: CPT | Mod: 91

## 2020-04-18 PROCEDURE — 63600175 PHARM REV CODE 636 W HCPCS: Performed by: INTERNAL MEDICINE

## 2020-04-18 PROCEDURE — 11000001 HC ACUTE MED/SURG PRIVATE ROOM

## 2020-04-18 PROCEDURE — 85025 COMPLETE CBC W/AUTO DIFF WBC: CPT

## 2020-04-18 RX ORDER — MORPHINE SULFATE 10 MG/ML
4 INJECTION INTRAMUSCULAR; INTRAVENOUS; SUBCUTANEOUS EVERY 6 HOURS PRN
Status: DISCONTINUED | OUTPATIENT
Start: 2020-04-18 | End: 2020-04-18

## 2020-04-18 RX ORDER — ONDANSETRON HYDROCHLORIDE 4 MG/5ML
8 SOLUTION ORAL
Status: DISCONTINUED | OUTPATIENT
Start: 2020-04-18 | End: 2020-04-20

## 2020-04-18 RX ORDER — DIPHENHYDRAMINE HYDROCHLORIDE 50 MG/ML
12.5 INJECTION INTRAMUSCULAR; INTRAVENOUS ONCE
Status: COMPLETED | OUTPATIENT
Start: 2020-04-18 | End: 2020-04-18

## 2020-04-18 RX ORDER — OXYCODONE AND ACETAMINOPHEN 10; 325 MG/1; MG/1
1 TABLET ORAL EVERY 4 HOURS PRN
Status: DISCONTINUED | OUTPATIENT
Start: 2020-04-18 | End: 2020-04-27 | Stop reason: HOSPADM

## 2020-04-18 RX ORDER — NAPROXEN SODIUM 220 MG/1
81 TABLET, FILM COATED ORAL DAILY
Status: DISCONTINUED | OUTPATIENT
Start: 2020-04-18 | End: 2020-04-27 | Stop reason: HOSPADM

## 2020-04-18 RX ADMIN — DIPHENHYDRAMINE HYDROCHLORIDE 12.5 MG: 50 INJECTION INTRAMUSCULAR; INTRAVENOUS at 05:04

## 2020-04-18 RX ADMIN — ONDANSETRON HYDROCHLORIDE 8 MG: 4 SOLUTION ORAL at 10:04

## 2020-04-18 RX ADMIN — Medication: at 01:04

## 2020-04-18 RX ADMIN — ONDANSETRON HYDROCHLORIDE 8 MG: 4 SOLUTION ORAL at 05:04

## 2020-04-18 RX ADMIN — HEPARIN SODIUM 14 UNITS/KG/HR: 10000 INJECTION, SOLUTION INTRAVENOUS at 11:04

## 2020-04-18 RX ADMIN — MORPHINE SULFATE 4 MG: 10 INJECTION INTRAVENOUS at 11:04

## 2020-04-18 RX ADMIN — METHYLPREDNISOLONE SODIUM SUCCINATE 40 MG: 40 INJECTION, POWDER, FOR SOLUTION INTRAMUSCULAR; INTRAVENOUS at 05:04

## 2020-04-18 RX ADMIN — OXYCODONE HYDROCHLORIDE AND ACETAMINOPHEN 1 TABLET: 10; 325 TABLET ORAL at 03:04

## 2020-04-18 RX ADMIN — ONDANSETRON HYDROCHLORIDE 8 MG: 4 SOLUTION ORAL at 08:04

## 2020-04-18 NOTE — PROGRESS NOTES
Patient will need Xeralto at time of discharge but has no insurance.  Patient assistance program application completed to the best of patient's ability.  There are 6 people in her home but she is unsure of their income.  TN faxed the application to:  [phone].  TN also gave MD free 30 day trial card to give to patient.

## 2020-04-18 NOTE — PROGRESS NOTES
[facility] Hospital Medicine  Progress Note    Patient Name: Wayne Garces  MRN: [id number]  Patient Class: IP- Inpatient   Admission Date: 2020  Length of Stay: 1 days  Attending Physician: Jayleen Gotti MD  Primary Care Provider: Primary Doctor No        Subjective:     Principal Problem:Acute deep vein thrombosis (DVT) of femoral vein of left lower extremity        HPI:  This is 24 y.o female with no medical history who presents to the hospital complaint of left leg pain for 4 days and progressively worsening for the last 2 days. Patient reports pain starting at her left thigh and radiating throughout the leg for the last 2 days. Associated symptoms are redness and swelling of left upper leg. Pain described as continuous aching 10/10 during the interview. She reports pain getting better if she raised her leg for the first 2 days but now nothing can help. She tried to take Tylenol at home but Tylenol doesn't help the leg pain just helps with her headache only. She finds she has descreased range of motion of the knee secondary to pain but denies any numbness in the leg. She reports she went to the hospital on 3/31/2020 because of fever, cough and body ache but sent home without Covid 19 testing. She has not been ambulating much at home secondary to body aches and fatigue. Denies any SOB, fever, chest pain, abdominal pain, n/v/d. No history of clotting disorder or DVT. Not on birth control pills. Denies smoking. Surgical history includes  section and tubal ligation per report.    In ED, US LLE vein shows extensive deep venous thrombosis involving the left common femoral vein, femoral vein, greater saphenous vein, popliteal vein, as well as anterior and posterior tibial veins and peroneal vein. EKG with sinus tachycardia 126 H/H 11.7/36.5; coagulation studies normal; Covid-19 positive.    History obtained via language line.    Overview/Hospital Course:  Ms Mcdonald  presented with left lower extremity swelling secondary to extensive deep vein thrombus extending from left iliac vein down to posterior tibial veins. ED consulted vascular surgery recommending pharmaco-mechanical thrombectomy and initiating heparin drip in the interim pending tentative procedure. Per discussion between ED physician and vascular surgeon was to transfer to [facility] for procedure. Patient tested positive for COVID-19, which is potentially cause for acute thrombus. Seems that because of positive test, transfer was cancelled. IR consulted.     Interval History: undergoing further lysis today    Review of Systems   Respiratory: Negative.    Cardiovascular: Negative.    Gastrointestinal: Negative.    Musculoskeletal:        Leg pain     Objective:     Vital Signs (Most Recent):  Temp: 98.6 °F (37 °C) (04/17/20 1823)  Pulse: (!) 128 (04/17/20 1823)  Resp: (!) 21 (04/17/20 1823)  BP: (!) 148/76 (04/17/20 1823)  SpO2: 100 % (04/17/20 1823) Vital Signs (24h Range):  Temp:  [98.6 °F (37 °C)-99 °F (37.2 °C)] 98.6 °F (37 °C)  Pulse:  [] 128  Resp:  [6-46] 21  SpO2:  [90 %-100 %] 100 %  BP: (112-174)/() 148/76     Weight: 97.8 kg (215 lb 9.8 oz)  Body mass index is 37.01 kg/m².    Intake/Output Summary (Last 24 hours) at 4/17/2020 1902  Last data filed at 4/17/2020 1756  Gross per 24 hour   Intake 823 ml   Output 840 ml   Net -17 ml      Physical Exam   Constitutional: She appears well-developed. No distress.   Cardiovascular: Regular rhythm.   Sinus tachycardia   Pulmonary/Chest: Effort normal.   Skin: She is not diaphoretic.   Nursing note and vitals reviewed.      Significant Labs: All pertinent labs within the past 24 hours have been reviewed.    Significant Imaging: I have reviewed all pertinent imaging results/findings within the past 24 hours.  I have reviewed and interpreted all pertinent imaging results/findings within the past 24 hours.      Assessment/Plan:      * Acute deep vein  thrombosis (DVT) of femoral vein of left lower extremity  Large thrombus likely related to COVID-19 infection  IR consulted. Has undergone one procedure for thrombolysis  Currently undergoing second round of thrombolysis with possible stent placement  Working with SW to help patient with cost of NOAC she will need for at least 3 months  Will discuss further plans with IR physician once procedure completed  Pain management and potential PT/OT consult    Acute deep vein thrombosis (DVT) of iliac vein of left lower extremity  As above      Acute deep vein thrombosis (DVT) of distal vein of left lower extremity  See above    Obesity (BMI 35.0-39.9 without comorbidity)  Recommend diet and lifestyle modifications outpatient    COVID-19 virus infection  Covid positive. Possibly contributory to DVT given risk for hyper coaguable state.   Respiratory status very stable    VTE Risk Mitigation (From admission, onward)         Ordered     heparin 25,000 units in dextrose 5% 250 mL (100 units/mL) infusion HIGH INTENSITY nomogram - [facility]  Continuous     Question:  Heparin Infusion Adjustment (DO NOT MODIFY ANSWER)  Answer:  \[email or web address]\epic\Images\Pharmacy\HeparinInfusions\heparin HIGH INTENSITY nomogram for [facility] [id number]    04/17/20 1749     heparin 25,000 units in dextrose 5% (100 units/ml) IV bolus from bag - ADDITIONAL PRN BOLUS - 60 units/kg  As needed (PRN)     Question:  Heparin Infusion Adjustment (DO NOT MODIFY ANSWER)  Answer:  \[email or web address]\epic\Images\Pharmacy\HeparinInfusions\heparin HIGH INTENSITY nomogram for [facility] [id number].pdf    04/17/20 1742     heparin 25,000 units in dextrose 5% (100 units/ml) IV bolus from bag - ADDITIONAL PRN BOLUS - 30 units/kg  As needed (PRN)     Question:  Heparin Infusion Adjustment (DO NOT MODIFY ANSWER)  Answer:  \[email or web address]\epic\Images\Pharmacy\HeparinInfusions\heparin HIGH INTENSITY nomogram for [facility] [id number].pdf    04/17/20 1742     heparin 25,000 units in dextrose 5% 250 mL (100  units/mL) infusion (heparin infusion - NO NOMOGRAM)  Continuous      04/16/20 1935     IP VTE HIGH RISK PATIENT  Once      04/16/20 1951                Critical care time spent on the evaluation and treatment of severe organ dysfunction, review of pertinent labs and imaging studies, discussions with consulting providers and discussions with patient/family: > 35 minutes.      Jayleen Ocasio MD  Department of Hospital Medicine   [facility]

## 2020-04-18 NOTE — PLAN OF CARE
Dr Gotti provided [place] interpretation for assessment  No Pharmacies Listed  Patient new to the area      04/18/20 3706   Discharge Assessment   Assessment Type Discharge Planning Assessment   Confirmed/corrected address and phone number on facesheet? Yes   Assessment information obtained from? Patient   Expected Length of Stay (days) 3   Communicated expected length of stay with patient/caregiver yes   Prior to hospitilization cognitive status: Alert/Oriented   Prior to hospitalization functional status: Independent   Current cognitive status: Alert/Oriented   Current Functional Status: Needs Assistance   Lives With other relative(s)   Able to Return to Prior Arrangements yes   Is patient able to care for self after discharge? Yes   Patient's perception of discharge disposition home or selfcare   Readmission Within the Last 30 Days no previous admission in last 30 days   Patient currently being followed by outpatient case management? No   Patient currently receives any other outside agency services? No   Equipment Currently Used at Home none   Do you have any problems affording any of your prescribed medications? No   Is the patient taking medications as prescribed? no   Does the patient have transportation home? Yes   Transportation Anticipated family or friend will provide   Does the patient receive services at the [facility] Clinic? No   Discharge Plan A Home with family   Discharge Plan B   (tbd)   DME Needed Upon Discharge  none   Patient/Family in Agreement with Plan yes

## 2020-04-18 NOTE — ASSESSMENT & PLAN NOTE
Covid positive. Possibly contributory to DVT given risk for hyper coaguable state.   Respiratory status very stable

## 2020-04-18 NOTE — ASSESSMENT & PLAN NOTE
Large thrombus likely related to COVID-19 infection  IR consulted. Underwent two sessions of thrombectomy/thrombolysis and stent placement in left internal iliac vein for severe stenosis not resolved with venoplasty. This was successful  Working with  to help patient with cost of NOAC she will need for at least 3 months  Is uninsured and does not have a SSN. I discussed with   Per my discussion with IR physician, patient can sit at edge of bed today, up in chair tomorrow and possibly ambulate by Monday. If patient cannot ambulate independently or with minimal assistance, will consult PT/OT  Stop dilaudid PCA and start oral narcotic pain regimen with breakthrough IV morphine if needed

## 2020-04-18 NOTE — SUBJECTIVE & OBJECTIVE
Interval History: undergoing further lysis today    Review of Systems   Respiratory: Negative.    Cardiovascular: Negative.    Gastrointestinal: Negative.    Musculoskeletal:        Leg pain     Objective:     Vital Signs (Most Recent):  Temp: 98.6 °F (37 °C) (04/17/20 1823)  Pulse: (!) 128 (04/17/20 1823)  Resp: (!) 21 (04/17/20 1823)  BP: (!) 148/76 (04/17/20 1823)  SpO2: 100 % (04/17/20 1823) Vital Signs (24h Range):  Temp:  [98.6 °F (37 °C)-99 °F (37.2 °C)] 98.6 °F (37 °C)  Pulse:  [] 128  Resp:  [6-46] 21  SpO2:  [90 %-100 %] 100 %  BP: (112-174)/() 148/76     Weight: 97.8 kg (215 lb 9.8 oz)  Body mass index is 37.01 kg/m².    Intake/Output Summary (Last 24 hours) at 4/17/2020 1902  Last data filed at 4/17/2020 1756  Gross per 24 hour   Intake 823 ml   Output 840 ml   Net -17 ml      Physical Exam   Constitutional: She appears well-developed. No distress.   Cardiovascular: Regular rhythm.   Sinus tachycardia   Pulmonary/Chest: Effort normal.   Skin: She is not diaphoretic.   Nursing note and vitals reviewed.      Significant Labs: All pertinent labs within the past 24 hours have been reviewed.    Significant Imaging: I have reviewed all pertinent imaging results/findings within the past 24 hours.  I have reviewed and interpreted all pertinent imaging results/findings within the past 24 hours.

## 2020-04-18 NOTE — SUBJECTIVE & OBJECTIVE
Interval History: with pain but otherwise ok. Is thirsty and hungry    Review of Systems   Respiratory: Negative.    Cardiovascular: Negative.    Gastrointestinal: Negative.    Musculoskeletal:        Left leg pain     Objective:     Vital Signs (Most Recent):  Temp: 99 °F (37.2 °C) (04/18/20 0800)  Pulse: (!) 118 (04/18/20 0830)  Resp: 14 (04/18/20 0830)  BP: 130/70 (04/18/20 0800)  SpO2: 100 % (04/18/20 0830) Vital Signs (24h Range):  Temp:  [97.9 °F (36.6 °C)-99 °F (37.2 °C)] 99 °F (37.2 °C)  Pulse:  [] 118  Resp:  [9-31] 14  SpO2:  [94 %-100 %] 100 %  BP: (116-153)/(55-89) 130/70     Weight: 97.8 kg (215 lb 9.8 oz)  Body mass index is 37.01 kg/m².    Intake/Output Summary (Last 24 hours) at 4/18/2020 0856  Last data filed at 4/18/2020 0800  Gross per 24 hour   Intake 949.31 ml   Output 1300 ml   Net -350.69 ml      Physical Exam   Constitutional: She is oriented to person, place, and time. She appears well-developed. No distress.   Cardiovascular: Intact distal pulses.   Sinus tachycardia   Pulmonary/Chest: Effort normal and breath sounds normal.   Abdominal: Soft.   Neurological: She is alert and oriented to person, place, and time.   Skin: She is not diaphoretic.   Psychiatric: She has a normal mood and affect. Her behavior is normal. Judgment and thought content normal.   Nursing note and vitals reviewed.      Significant Labs: All pertinent labs within the past 24 hours have been reviewed.    Significant Imaging: I have reviewed all pertinent imaging results/findings within the past 24 hours.  I have reviewed and interpreted all pertinent imaging results/findings within the past 24 hours.

## 2020-04-18 NOTE — PLAN OF CARE
Problem: Adult Inpatient Plan of Care  Goal: Plan of Care Review  Outcome: Ongoing, Progressing    Patient is awake alert oriented. Speaks Turkmen only. Pt had a IVC procedure yesterday with Vascular surgery. S/P Left iliac and LLE venogram with mechanical thrombectomy, Venoplasty of left common iliac vein stenosis with stent placement. Dressing to the left popliteal area is clean dry and intact. No drainage noted. Pulses is +2 to the Dorsalis pedis distal to the stent placed. No swelling or redness noted. On PCA pump able to use without any problem. Remain on Heparin drip and titrated as per protocol. Continue to monitor closely.

## 2020-04-18 NOTE — ASSESSMENT & PLAN NOTE
Large thrombus likely related to COVID-19 infection  IR consulted. Has undergone one procedure for thrombolysis  Currently undergoing second round of thrombolysis with possible stent placement  Working with SW to help patient with cost of NOAC she will need for at least 3 months  Will discuss further plans with IR physician once procedure completed  Pain management and potential PT/OT consult

## 2020-04-18 NOTE — PROVIDER TRANSFER
Ms Mcdonald is a 24 yo female who presented with acute left lower extremity DVT in setting of positive COVID-19 test. Thrombus is large starting at left iliac vein extending down to posterior tibial veins. Vascular surgery was consulted in the ED. Recommendation was to transfer to [facility] for pharmaco-mechanical treatment but due to COVID infection this transfer was denied. Patient admitted to our instiution and IR consulted. Underwent two sessions of thrombolysis/thrombectomy. A stent also placed in iliac vein for severe stenosis 2/2 external compression. Placed on heparin infusion (uring pTT as it correlated with anti-Xa) with plans to transition to NOAC at discharge. Patient is uninsured and has no SSN. I have submitted an application for Rx assistance program. Patient also needs aspirin 81 mg indefinitely and plavix for at least 2 months if able to afford, per IR recs. IR needs to remove stitches prior to discharge.     Addendum: patient complained to nurse about tongue swelling. Per nurse, this is just minimal and is protecting airway. It seems this started after patient got morphine IV. Will stop. Did tolerate dilaudid PCA pump for 2 days without issues. Will give a dose of solumedrol and benadryl now and monitor closely.

## 2020-04-18 NOTE — NURSING
Upon arrival to floor from ICU: patient oriented to room, call bell in reach and bed in lowest position. No apparent distress noted.

## 2020-04-18 NOTE — BRIEF OP NOTE
Radiology Post-Procedure Note    Pre Op Diagnosis: Acute-to-subacute left iliofemoral, femoropopliteal and infra-popliteal extensive DVT with mobile iliofemoral components  Post Op Diagnosis: Same    Procedure: 1. IVC, left iliac and LLE venograms  2. Mechanical thrombectomy with 13-Fr,  80-cm Inari ClotTriever device  3. Venoplasty of severe focal left common iliac vein stenosis with 16-mm non-compliant balloon  4. 16 x 90-mm Wall stent placement across residual severe stenosis of Lt common iliac vein    Procedure performed by: Darin Boo MD    Written Informed Consent Obtained: Yes  Specimen Removed: YES, large clot burden from Lt iliofemoral veins  Estimated Blood Loss: less than 100     Findings:   Lt iliocaval and LLE venogram shows marked clearance of acute mobile component of thrombus from the left femoropopliteal and infra-popliteal veins s/p overnight thrombolysis.     However, persistent large burden of thrombus in the left iliac veins that is partially mobile and extends to origin of IVC and at high risk for thrombo-emoblism. Again noted is apparent severe focal narrowing of the proximal left common iliac vein that is highly concerning for external venous compression.     Pt now s/p successful mechanical thrombectomy of residual iliac vein thrombus venoplasty of severe focal LtCIV stenosis. Severe stenosis of LtCIV persisted s/p venoplasty which was subsequently successfully stented with 16-mm Wall stent.    No residual stenosis or thrombus with straight inline flow and now rapid antegreade flow of contrast from left infra-popliteal veins to the IVC on post intervention venograms.     Pt placed back on Heparin gtt at 17.6-mg/kg/hr as bridge to OAC therapy which should preferably be continued for 6 months with f/u LLE venous US and 3 and 6 months. If US shows no recurrence of DVT, consider d/c OAC at that time. I recommend pt start and continue on low dose ASA for life.     Patient tolerated the  procedure well. No immediate post-procedural complications noted.     Thank you for considering IR for the care of your patient.     Darin Boo MD  Interventional Radiology

## 2020-04-18 NOTE — ANESTHESIA POSTPROCEDURE EVALUATION
Anesthesia Post Evaluation    Patient: Wayne Garces    Procedure(s) Performed: Procedure(s) (LRB):  THROMBOLYSIS OR THROMBECTOMY, BLOOD VESSEL, LOWER EXTREMITY (Left)    Final Anesthesia Type: MAC    Patient participation: Yes- Able to Participate  Level of consciousness: awake and alert  Post-procedure vital signs: reviewed and stable  Pain management: adequate  Airway patency: patent    PONV status at discharge: No PONV  Anesthetic complications: no      Cardiovascular status: blood pressure returned to baseline and hemodynamically stable  Respiratory status: unassisted and spontaneous ventilation  Hydration status: euvolemic  Follow-up not needed.          Vitals Value Taken Time   /70 4/18/2020  8:01 AM   Temp 37.2 °C (99 °F) 4/18/2020  8:00 AM   Pulse 123 4/18/2020  8:08 AM   Resp 8 4/18/2020  8:08 AM   SpO2 100 % 4/18/2020  8:08 AM   Vitals shown include unvalidated device data.      No case tracking events are documented in the log.      Pain/Stewart Score: Pain Rating Prior to Med Admin: 3 (4/18/2020  1:34 AM)

## 2020-04-18 NOTE — PROGRESS NOTES
[facility] Hospital Medicine  Progress Note    Patient Name: Wayne Garces  MRN: [id number]  Patient Class: IP- Inpatient   Admission Date: 2020  Length of Stay: 2 days  Attending Physician: Jayleen Gotti MD  Primary Care Provider: Primary Doctor No        Subjective:     Principal Problem:Acute deep vein thrombosis (DVT) of femoral vein of left lower extremity        HPI:  This is 24 y.o female with no medical history who presents to the hospital complaint of left leg pain for 4 days and progressively worsening for the last 2 days. Patient reports pain starting at her left thigh and radiating throughout the leg for the last 2 days. Associated symptoms are redness and swelling of left upper leg. Pain described as continuous aching 10/10 during the interview. She reports pain getting better if she raised her leg for the first 2 days but now nothing can help. She tried to take Tylenol at home but Tylenol doesn't help the leg pain just helps with her headache only. She finds she has descreased range of motion of the knee secondary to pain but denies any numbness in the leg. She reports she went to the hospital on 3/31/2020 because of fever, cough and body ache but sent home without Covid 19 testing. She has not been ambulating much at home secondary to body aches and fatigue. Denies any SOB, fever, chest pain, abdominal pain, n/v/d. No history of clotting disorder or DVT. Not on birth control pills. Denies smoking. Surgical history includes  section and tubal ligation per report.    In ED, US LLE vein shows extensive deep venous thrombosis involving the left common femoral vein, femoral vein, greater saphenous vein, popliteal vein, as well as anterior and posterior tibial veins and peroneal vein. EKG with sinus tachycardia 126 H/H 11.7/36.5; coagulation studies normal; Covid-19 positive.    History obtained via language line.    Overview/Hospital Course:  Ms Mcdonald  presented with left lower extremity swelling secondary to extensive deep vein thrombus extending from left iliac vein down to posterior tibial veins. ED consulted vascular surgery recommending pharmaco-mechanical thrombectomy and initiating heparin drip in the interim pending tentative procedure. Per discussion between ED physician and vascular surgeon was to transfer to [facility] for procedure. Patient tested positive for COVID-19, which is potentially cause for acute thrombus. Seems that because of positive test, transfer was cancelled. IR consulted. Underwent 2 sessions of thrombectomy/thrombolysis. IR physician incidentally noted severe focal narrowing of the proximal left common iliac vein concerning for external venous compression. Underwent venoplasty without resolution of stenosis, therefore had stent placed. Stenosis successfully treated with this. Patient tolerated procedure very well and transferred back to ICU for close monitoring. Is on heparin infusion with plans to transition to NOAC. IR physician also recommends aspirin 81 mg daily indefinitely for stent. Patient is uninsured and has no SSN. Patient assistance program application submitted to assist with cost of NOAC which she needs for at least 3-6 months. Stepped down to floor 4/18    Interval History: with pain but otherwise ok. Is thirsty and hungry    Review of Systems   Respiratory: Negative.    Cardiovascular: Negative.    Gastrointestinal: Negative.    Musculoskeletal:        Left leg pain     Objective:     Vital Signs (Most Recent):  Temp: 99 °F (37.2 °C) (04/18/20 0800)  Pulse: (!) 118 (04/18/20 0830)  Resp: 14 (04/18/20 0830)  BP: 130/70 (04/18/20 0800)  SpO2: 100 % (04/18/20 0830) Vital Signs (24h Range):  Temp:  [97.9 °F (36.6 °C)-99 °F (37.2 °C)] 99 °F (37.2 °C)  Pulse:  [] 118  Resp:  [9-31] 14  SpO2:  [94 %-100 %] 100 %  BP: (116-153)/(55-89) 130/70     Weight: 97.8 kg (215 lb 9.8 oz)  Body mass index is 37.01  kg/m².    Intake/Output Summary (Last 24 hours) at 4/18/2020 0856  Last data filed at 4/18/2020 0800  Gross per 24 hour   Intake 949.31 ml   Output 1300 ml   Net -350.69 ml      Physical Exam   Constitutional: She is oriented to person, place, and time. She appears well-developed. No distress.   Cardiovascular: Intact distal pulses.   Sinus tachycardia   Pulmonary/Chest: Effort normal and breath sounds normal.   Abdominal: Soft.   Neurological: She is alert and oriented to person, place, and time.   Skin: She is not diaphoretic.   Psychiatric: She has a normal mood and affect. Her behavior is normal. Judgment and thought content normal.   Nursing note and vitals reviewed.      Significant Labs: All pertinent labs within the past 24 hours have been reviewed.    Significant Imaging: I have reviewed all pertinent imaging results/findings within the past 24 hours.  I have reviewed and interpreted all pertinent imaging results/findings within the past 24 hours.      Assessment/Plan:      * Acute deep vein thrombosis (DVT) of femoral vein of left lower extremity  Large thrombus likely related to COVID-19 infection  IR consulted. Underwent two sessions of thrombectomy/thrombolysis and stent placement in left internal iliac vein for severe stenosis not resolved with venoplasty. This was successful  Working with  to help patient with cost of NOAC she will need for at least 3 months  Is uninsured and does not have a SSN. I discussed with   Per my discussion with IR physician, patient can sit at edge of bed today, up in chair tomorrow and possibly ambulate by Monday. If patient cannot ambulate independently or with minimal assistance, will consult PT/OT  Stop dilaudid PCA and start oral narcotic pain regimen with breakthrough IV morphine if needed    Acute deep vein thrombosis (DVT) of iliac vein of left lower extremity  As above      Acute deep vein thrombosis (DVT) of distal vein of left lower  extremity  See above    Obesity (BMI 35.0-39.9 without comorbidity)  Recommend diet and lifestyle modifications outpatient    COVID-19 virus infection  Covid positive. Possibly contributory to DVT given risk for hyper coaguable state.   Respiratory status very stable    VTE Risk Mitigation (From admission, onward)         Ordered     heparin 25,000 units in dextrose 5% 250 mL (100 units/mL) infusion HIGH INTENSITY nomogram - [facility]  Continuous     Question:  Heparin Infusion Adjustment (DO NOT MODIFY ANSWER)  Answer:  \\[email or web address]\epic\Images\Pharmacy\HeparinInfusions\heparin HIGH INTENSITY nomogram for [facility] [id number].pdf    04/17/20 1749     heparin 25,000 units in dextrose 5% (100 units/ml) IV bolus from bag - ADDITIONAL PRN BOLUS - 60 units/kg  As needed (PRN)     Question:  Heparin Infusion Adjustment (DO NOT MODIFY ANSWER)  Answer:  [email or web address]\epic\Images\Pharmacy\HeparinInfusions\heparin HIGH INTENSITY nomogram for [facility] [id number].pdf    04/17/20 1742     heparin 25,000 units in dextrose 5% (100 units/ml) IV bolus from bag - ADDITIONAL PRN BOLUS - 30 units/kg  As needed (PRN)     Question:  Heparin Infusion Adjustment (DO NOT MODIFY ANSWER)  Answer:  \\[email or web address]\epic\Images\Pharmacy\HeparinInfusions\heparin HIGH INTENSITY nomogram for [facility] [id number].pdf    04/17/20 1742     IP VTE HIGH RISK PATIENT  Once      04/16/20 1951              Stable for step down to floor    Critical care time spent on the evaluation and treatment of severe organ dysfunction, review of pertinent labs and imaging studies, discussions with consulting providers and discussions with patient/family: > 35 minutes.      Jayleen Ocasio MD  Department of Hospital Medicine   [facility]

## 2020-04-19 LAB
ANION GAP SERPL CALC-SCNC: 10 MMOL/L (ref 8–16)
APTT BLDCRRT: 25.5 SEC (ref 21–32)
APTT BLDCRRT: 33.4 SEC (ref 21–32)
APTT BLDCRRT: 41.5 SEC (ref 21–32)
BASOPHILS # BLD AUTO: 0 K/UL (ref 0–0.2)
BASOPHILS NFR BLD: 0 % (ref 0–1.9)
BUN SERPL-MCNC: 13 MG/DL (ref 6–20)
CALCIUM SERPL-MCNC: 9 MG/DL (ref 8.7–10.5)
CHLORIDE SERPL-SCNC: 105 MMOL/L (ref 95–110)
CO2 SERPL-SCNC: 25 MMOL/L (ref 23–29)
CREAT SERPL-MCNC: 0.8 MG/DL (ref 0.5–1.4)
DIFFERENTIAL METHOD: ABNORMAL
EOSINOPHIL # BLD AUTO: 0 K/UL (ref 0–0.5)
EOSINOPHIL NFR BLD: 0 % (ref 0–8)
ERYTHROCYTE [DISTWIDTH] IN BLOOD BY AUTOMATED COUNT: 12.3 % (ref 11.5–14.5)
EST. GFR  (AFRICAN AMERICAN): >60 ML/MIN/1.73 M^2
EST. GFR  (NON AFRICAN AMERICAN): >60 ML/MIN/1.73 M^2
GLUCOSE SERPL-MCNC: 113 MG/DL (ref 70–110)
HCT VFR BLD AUTO: 24.1 % (ref 37–48.5)
HGB BLD-MCNC: 7.8 G/DL (ref 12–16)
IMM GRANULOCYTES # BLD AUTO: 0.05 K/UL (ref 0–0.04)
IMM GRANULOCYTES NFR BLD AUTO: 0.7 % (ref 0–0.5)
LYMPHOCYTES # BLD AUTO: 0.7 K/UL (ref 1–4.8)
LYMPHOCYTES NFR BLD: 9.8 % (ref 18–48)
MCH RBC QN AUTO: 27.3 PG (ref 27–31)
MCHC RBC AUTO-ENTMCNC: 32.4 G/DL (ref 32–36)
MCV RBC AUTO: 84 FL (ref 82–98)
MONOCYTES # BLD AUTO: 0.5 K/UL (ref 0.3–1)
MONOCYTES NFR BLD: 6.6 % (ref 4–15)
NEUTROPHILS # BLD AUTO: 5.9 K/UL (ref 1.8–7.7)
NEUTROPHILS NFR BLD: 82.9 % (ref 38–73)
NRBC BLD-RTO: 0 /100 WBC
PLATELET # BLD AUTO: 196 K/UL (ref 150–350)
PMV BLD AUTO: 11.6 FL (ref 9.2–12.9)
POTASSIUM SERPL-SCNC: 3.9 MMOL/L (ref 3.5–5.1)
RBC # BLD AUTO: 2.86 M/UL (ref 4–5.4)
SODIUM SERPL-SCNC: 140 MMOL/L (ref 136–145)
WBC # BLD AUTO: 7.16 K/UL (ref 3.9–12.7)

## 2020-04-19 PROCEDURE — 80048 BASIC METABOLIC PNL TOTAL CA: CPT

## 2020-04-19 PROCEDURE — 94761 N-INVAS EAR/PLS OXIMETRY MLT: CPT

## 2020-04-19 PROCEDURE — 36415 COLL VENOUS BLD VENIPUNCTURE: CPT

## 2020-04-19 PROCEDURE — 85730 THROMBOPLASTIN TIME PARTIAL: CPT | Mod: 91

## 2020-04-19 PROCEDURE — 11000001 HC ACUTE MED/SURG PRIVATE ROOM

## 2020-04-19 PROCEDURE — 85025 COMPLETE CBC W/AUTO DIFF WBC: CPT

## 2020-04-19 PROCEDURE — 63600175 PHARM REV CODE 636 W HCPCS: Performed by: INTERNAL MEDICINE

## 2020-04-19 PROCEDURE — 25000003 PHARM REV CODE 250: Performed by: INTERNAL MEDICINE

## 2020-04-19 RX ADMIN — ONDANSETRON HYDROCHLORIDE 8 MG: 4 SOLUTION ORAL at 04:04

## 2020-04-19 RX ADMIN — ONDANSETRON HYDROCHLORIDE 8 MG: 4 SOLUTION ORAL at 11:04

## 2020-04-19 RX ADMIN — HEPARIN SODIUM 14.05 UNITS/KG/HR: 10000 INJECTION, SOLUTION INTRAVENOUS at 10:04

## 2020-04-19 RX ADMIN — ONDANSETRON HYDROCHLORIDE 8 MG: 4 SOLUTION ORAL at 08:04

## 2020-04-19 RX ADMIN — ASPIRIN 81 MG 81 MG: 81 TABLET ORAL at 07:04

## 2020-04-19 RX ADMIN — HEPARIN SODIUM 17.05 UNITS/KG/HR: 10000 INJECTION, SOLUTION INTRAVENOUS at 02:04

## 2020-04-19 RX ADMIN — ONDANSETRON HYDROCHLORIDE 8 MG: 4 SOLUTION ORAL at 07:04

## 2020-04-19 NOTE — SUBJECTIVE & OBJECTIVE
Interval History: No new complaints.    Review of Systems   HENT: Negative for ear discharge and ear pain.    Eyes: Negative for discharge and itching.   Endocrine: Negative for cold intolerance and heat intolerance.   Neurological: Negative for seizures and syncope.     Objective:     Vital Signs (Most Recent):  Temp: 98.5 °F (36.9 °C) (04/19/20 1100)  Pulse: 93 (04/19/20 1100)  Resp: 20 (04/19/20 1100)  BP: 130/74 (04/19/20 1100)  SpO2: 95 % (04/19/20 1100) Vital Signs (24h Range):  Temp:  [98.5 °F (36.9 °C)-98.9 °F (37.2 °C)] 98.5 °F (36.9 °C)  Pulse:  [] 93  Resp:  [18-20] 20  SpO2:  [93 %-95 %] 95 %  BP: (130-134)/(60-74) 130/74     Weight: 97.8 kg (215 lb 9.8 oz)  Body mass index is 37.01 kg/m².    Intake/Output Summary (Last 24 hours) at 4/19/2020 1744  Last data filed at 4/19/2020 1431  Gross per 24 hour   Intake 561.06 ml   Output 1080 ml   Net -518.94 ml      Physical Exam   Constitutional: She is oriented to person, place, and time. She appears well-developed. No distress.   Cardiovascular: Normal rate and intact distal pulses.   Pulmonary/Chest: Effort normal and breath sounds normal.   Abdominal: Soft.   Neurological: She is alert and oriented to person, place, and time.   Skin: She is not diaphoretic.   Psychiatric: She has a normal mood and affect. Her behavior is normal. Judgment and thought content normal.   Nursing note and vitals reviewed.      Significant Labs:   BMP:   Recent Labs   Lab 04/19/20  0643   *      K 3.9      CO2 25   BUN 13   CREATININE 0.8   CALCIUM 9.0     CBC:   Recent Labs   Lab 04/17/20  1858 04/18/20  0337 04/19/20  0643   WBC 9.33 10.70 7.16   HGB 8.9* 9.5* 7.8*   HCT 29.7* 30.5* 24.1*    191 196       Significant Imaging: I have reviewed all pertinent imaging results/findings within the past 24 hours.

## 2020-04-19 NOTE — PLAN OF CARE
Problem: Infection  Goal: Infection Symptom Resolution  Outcome: Ongoing, Progressing  Intervention: Prevent or Manage Infection  Flowsheets (Taken 4/19/2020 1522)  Fever Reduction/Comfort Measures: medication administered  Infection Management: aseptic technique maintained  Isolation Precautions: airborne precautions maintained; contact precautions maintained; droplet precautions maintained     Problem: Fall Injury Risk  Goal: Absence of Fall and Fall-Related Injury  Outcome: Ongoing, Progressing  Intervention: Identify and Manage Contributors to Fall Injury Risk  Flowsheets (Taken 4/19/2020 1522)  Self-Care Promotion: meal setup provided; BADL personal objects within reach; independence encouraged  Medication Review/Management: medications reviewed  Intervention: Promote Injury-Free Environment  Flowsheets (Taken 4/19/2020 1522)  Safety Promotion/Fall Prevention: assistive device/personal item within reach; bed alarm set; side rails raised x 2; medications reviewed; nonskid shoes/socks when out of bed; Fall Risk reviewed with patient/family; instructed to call staff for mobility  Environmental Safety Modification: clutter free environment maintained; assistive device/personal items within reach     Problem: Skin Injury Risk Increased  Goal: Skin Health and Integrity  Outcome: Ongoing, Progressing  Intervention: Optimize Skin Protection  Flowsheets (Taken 4/19/2020 1522)  Pressure Reduction Techniques: frequent weight shift encouraged; weight shift assistance provided  Pressure Reduction Devices: positioning supports utilized  Skin Protection: adhesive use limited; incontinence pads utilized  Head of Bed (HOB): HOB at 20-30 degrees  Intervention: Promote and Optimize Oral Intake  Flowsheets (Taken 4/19/2020 1522)  Oral Nutrition Promotion: physical activity promoted    Pt AAOx4, VSS, maintains SPO2 95% RA, remains free of fall, uses bedpan - voids without difficulty, pain tolerable per pt, airborne/ droplet/  contact precaution maintained.

## 2020-04-19 NOTE — PROGRESS NOTES
Inpatient Radiology Pre-procedure Note    History of Present Illness:  Wayne Garces is a 25 y.o. female with acute LLE DVT with iliac vein extension suspected to be 2/2 to Lt CIV stenosis s/p thrombolysis, thrombectomy, venoplasty and stent placement with successful imaging resolution of all Lt iliac/LLE thrombus and severe Lt CIV stenosis.     Pt reports diffuse LLE 10/10 constant pain at rest has resolved however, with persistent mild LLE swelling and pain with palpation and ambulation limiting ambulating to sitting at bedside to date. Pt appears to be gradually progressing in positive direction and is voiding well and tolerating PO without N/V.     Still awaiting to obtain OAC and for pt to be able to safely ambulate solo prior to discharge.    Admission H&P reviewed.  History reviewed. No pertinent past medical history.  Past Surgical History:   Procedure Laterality Date     SECTION      TUBAL LIGATION       Review of Systems:   As documented in primary team H&P    Home Meds:   Prior to Admission medications    Medication Sig Start Date End Date Taking? Authorizing Provider   acetaminophen (TYLENOL) 650 MG TbSR Take 650 mg by mouth every 8 (eight) hours.   Yes Historical Provider, MD     Scheduled Meds:    aspirin  81 mg Oral Daily    ondansetron  8 mg Oral QID (AC & HS)     Continuous Infusions:    heparin (porcine) in D5W 14.047 Units/kg/hr (20 1035)     PRN Meds:heparin (PORCINE), heparin (PORCINE), naloxone, oxyCODONE-acetaminophen, sodium chloride 0.9%     Anticoagulants/Antiplatelets: Heparin    Allergies: Review of patient's allergies indicates:  No Known Allergies     Sedation Hx: have not been any systemic reactions    Labs:  Recent Labs   Lab 20  1858   INR 1.3*       Recent Labs   Lab 20  0643   WBC 7.16   HGB 7.8*   HCT 24.1*   MCV 84         Recent Labs   Lab 20  0315 20  0643   * 113*    140   K 4.2 3.9    105   CO2  23 25   BUN 15 13   CREATININE 1.2 0.8   CALCIUM 9.1 9.0   ALT 27  --    AST 40  --    ALBUMIN 3.8  --    BILITOT 2.7*  --      Vitals:  Temp: 98.8 °F (37.1 °C) (04/19/20 0700)  Pulse: 90 (04/19/20 0700)  Resp: 20 (04/19/20 0700)  BP: 131/72 (04/19/20 0700)  SpO2: 95 % (04/19/20 0700)     Physical Exam:  General: no acute distress  Mental Status: alert and oriented to person, place and time  HEENT: normocephalic, atraumatic  Chest: unlabored breathing  Heart: regular heart rate  Abdomen: nondistended  Extremity: +1 edema of LLE, slightly improved. +2DP bilaterally, significantly improved in the LLE. Cap refill nml. BLE equally warm to touch, significantly improved. No further discoloration noted in LLE, previously minimal.     A/P:  25 y.o. female with acute LLE DVT with iliac vein extension suspected to be 2/2 to Lt CIV stenosis s/p thrombolysis, thrombectomy, venoplasty and stent placement with successful imaging resolution of all Lt iliac/LLE thrombus and severe Lt CIV stenosis.     1. Acute LLE and iliac DVT s/p successful interventin - Diffuse LLE 10/10 constant pain at rest has resolved, LLE edema pedal pulses and coloration have improved.  However, with persistent pain with palpation and ambulation limiting ambulating to sitting at bedside to date. Pt appears to be gradually progressing in positive direction and is voiding well and tolerating PO without N/V. Still awaiting to obtain OAC and for pt to be able to safely ambulate solo prior to discharge.     Have ordered repeat LLE venous US to confirm persistent resolution of LLE DVT.    Assuming US reports no recurrent DVT, recommend continued encouragement of assisted sitting at bedside and ambulating for short intervals and consider inpatient PT as needed for this purpose. Once pt ambulating on her own safely and transitioned to OAC, okay to discharge.    Pt will require LLE venous US at 3 and 6 months post intervention with potential for D/C OAC at 6 months  post assuming no recurrence of DVT.     Thank you for considering IR for the care of your patient.     Darin Boo MD  Interventional Radiology

## 2020-04-19 NOTE — PROGRESS NOTES
[facility] Hospital Medicine  Progress Note    Patient Name: Wayne Garces  MRN: [id number]  Patient Class: IP- Inpatient   Admission Date: 2020  Length of Stay: 3 days  Attending Physician: Zackery Mcdonald MD  Primary Care Provider: Primary Doctor No        Subjective:     Principal Problem:Acute deep vein thrombosis (DVT) of femoral vein of left lower extremity        HPI:  This is 24 y.o female with no medical history who presents to the hospital complaint of left leg pain for 4 days and progressively worsening for the last 2 days. Patient reports pain starting at her left thigh and radiating throughout the leg for the last 2 days. Associated symptoms are redness and swelling of left upper leg. Pain described as continuous aching 10/10 during the interview. She reports pain getting better if she raised her leg for the first 2 days but now nothing can help. She tried to take Tylenol at home but Tylenol doesn't help the leg pain just helps with her headache only. She finds she has descreased range of motion of the knee secondary to pain but denies any numbness in the leg. She reports she went to the hospital on 3/31/2020 because of fever, cough and body ache but sent home without Covid 19 testing. She has not been ambulating much at home secondary to body aches and fatigue. Denies any SOB, fever, chest pain, abdominal pain, n/v/d. No history of clotting disorder or DVT. Not on birth control pills. Denies smoking. Surgical history includes  section and tubal ligation per report.    In ED, US LLE vein shows extensive deep venous thrombosis involving the left common femoral vein, femoral vein, greater saphenous vein, popliteal vein, as well as anterior and posterior tibial veins and peroneal vein. EKG with sinus tachycardia 126 H/H 11.7/36.5; coagulation studies normal; Covid-19 positive.    History obtained via language line.    Overview/Hospital Course:  Ms Mcdonald  presented with left lower extremity swelling secondary to extensive deep vein thrombus extending from left iliac vein down to posterior tibial veins. ED consulted vascular surgery recommending pharmaco-mechanical thrombectomy and initiating heparin drip in the interim pending tentative procedure. Per discussion between ED physician and vascular surgeon was to transfer to [facility] [place] for procedure. Patient tested positive for COVID-19, which is potentially cause for acute thrombus. Seems that because of positive test, transfer was cancelled. IR consulted. Underwent 2 sessions of thrombectomy/thrombolysis. IR physician incidentally noted severe focal narrowing of the proximal left common iliac vein concerning for external venous compression. Underwent venoplasty without resolution of stenosis, therefore had stent placed. Stenosis successfully treated with this. Patient tolerated procedure very well and transferred back to ICU for close monitoring. Is on heparin infusion with plans to transition to NOAC. IR physician also recommends aspirin 81 mg daily indefinitely for stent. Patient is uninsured and has no SSN. Patient assistance program application submitted to assist with cost of NOAC which she needs for at least 3-6 months. Stepped down to floor 4/18    Interval History: No new complaints.    Review of Systems   HENT: Negative for ear discharge and ear pain.    Eyes: Negative for discharge and itching.   Endocrine: Negative for cold intolerance and heat intolerance.   Neurological: Negative for seizures and syncope.     Objective:     Vital Signs (Most Recent):  Temp: 98.5 °F (36.9 °C) (04/19/20 1100)  Pulse: 93 (04/19/20 1100)  Resp: 20 (04/19/20 1100)  BP: 130/74 (04/19/20 1100)  SpO2: 95 % (04/19/20 1100) Vital Signs (24h Range):  Temp:  [98.5 °F (36.9 °C)-98.9 °F (37.2 °C)] 98.5 °F (36.9 °C)  Pulse:  [] 93  Resp:  [18-20] 20  SpO2:  [93 %-95 %] 95 %  BP: (130-134)/(60-74) 130/74     Weight: 97.8 kg (215  lb 9.8 oz)  Body mass index is 37.01 kg/m².    Intake/Output Summary (Last 24 hours) at 4/19/2020 1744  Last data filed at 4/19/2020 1431  Gross per 24 hour   Intake 561.06 ml   Output 1080 ml   Net -518.94 ml      Physical Exam   Constitutional: She is oriented to person, place, and time. She appears well-developed. No distress.   Cardiovascular: Normal rate and intact distal pulses.   Pulmonary/Chest: Effort normal and breath sounds normal.   Abdominal: Soft.   Neurological: She is alert and oriented to person, place, and time.   Skin: She is not diaphoretic.   Psychiatric: She has a normal mood and affect. Her behavior is normal. Judgment and thought content normal.   Nursing note and vitals reviewed.      Significant Labs:   BMP:   Recent Labs   Lab 04/19/20  0643   *      K 3.9      CO2 25   BUN 13   CREATININE 0.8   CALCIUM 9.0     CBC:   Recent Labs   Lab 04/17/20  1858 04/18/20  0337 04/19/20  0643   WBC 9.33 10.70 7.16   HGB 8.9* 9.5* 7.8*   HCT 29.7* 30.5* 24.1*    191 196       Significant Imaging: I have reviewed all pertinent imaging results/findings within the past 24 hours.      Assessment/Plan:      * Acute deep vein thrombosis (DVT) of femoral vein of left lower extremity  Large thrombus likely related to COVID-19 infection  IR consulted. Underwent two sessions of thrombectomy/thrombolysis and stent placement in left internal iliac vein for severe stenosis not resolved with venoplasty. This was successful  Working with  to help patient with cost of NOAC she will need for at least 3 months  Is uninsured and does not have a SSN. I discussed with   Stop dilaudid PCA and started oral narcotic pain regimen with breakthrough IV morphine if needed    Acute deep vein thrombosis (DVT) of iliac vein of left lower extremity  As above      Acute deep vein thrombosis (DVT) of distal vein of left lower extremity  See above    Obesity (BMI 35.0-39.9 without  comorbidity)  Recommend diet and lifestyle modifications outpatient    COVID-19 virus infection  Covid positive. Possibly contributory to DVT given risk for hyper coaguable state.   Respiratory status very stable      VTE Risk Mitigation (From admission, onward)         Ordered     heparin 25,000 units in dextrose 5% 250 mL (100 units/mL) infusion HIGH INTENSITY nomogram - [facility]  Continuous     Question:  Heparin Infusion Adjustment (DO NOT MODIFY ANSWER)  Answer:  [email or web address]\epic\Images\Pharmacy\HeparinInfusions\heparin HIGH INTENSITY nomogram for [facility] [id number].pdf    04/17/20 1749     heparin 25,000 units in dextrose 5% (100 units/ml) IV bolus from bag - ADDITIONAL PRN BOLUS - 60 units/kg  As needed (PRN)     Question:  Heparin Infusion Adjustment (DO NOT MODIFY ANSWER)  Answer:  [email or web address]\epic\Images\Pharmacy\HeparinInfusions\heparin HIGH INTENSITY nomogram for [facility] [id number].pdf    04/17/20 1742     heparin 25,000 units in dextrose 5% (100 units/ml) IV bolus from bag - ADDITIONAL PRN BOLUS - 30 units/kg  As needed (PRN)     Question:  Heparin Infusion Adjustment (DO NOT MODIFY ANSWER)  Answer:  \\[email or web address]\epic\Images\Pharmacy\HeparinInfusions\heparin HIGH INTENSITY nomogram for [facility] [id number].pdf    04/17/20 1742     IP VTE HIGH RISK PATIENT  Once      04/16/20 1951                      Zackery Mcdonald MD  Department of Hospital Medicine   [facility]

## 2020-04-19 NOTE — NURSING
Called to use bathroom. Wants to be catherazied. Informed that was done because the wilson wa taken out and she had not voided earlier. PCT speaks fluid Mongolian. Placed on bedpan by PCT. Voided large amount of urine. Bladder scan done.

## 2020-04-19 NOTE — ASSESSMENT & PLAN NOTE
Large thrombus likely related to COVID-19 infection  IR consulted. Underwent two sessions of thrombectomy/thrombolysis and stent placement in left internal iliac vein for severe stenosis not resolved with venoplasty. This was successful  Working with  to help patient with cost of NOAC she will need for at least 3 months  Is uninsured and does not have a SSN. I discussed with   Stop dilaudid PCA and started oral narcotic pain regimen with breakthrough IV morphine if needed

## 2020-04-20 LAB
ALLENS TEST: ABNORMAL
APTT BLDCRRT: 56.3 SEC (ref 21–32)
BASOPHILS # BLD AUTO: 0.02 K/UL (ref 0–0.2)
BASOPHILS NFR BLD: 0.3 % (ref 0–1.9)
DIFFERENTIAL METHOD: ABNORMAL
EOSINOPHIL # BLD AUTO: 0.1 K/UL (ref 0–0.5)
EOSINOPHIL NFR BLD: 2.2 % (ref 0–8)
ERYTHROCYTE [DISTWIDTH] IN BLOOD BY AUTOMATED COUNT: 12.5 % (ref 11.5–14.5)
HCT VFR BLD AUTO: 23.8 % (ref 37–48.5)
HGB BLD-MCNC: 7.5 G/DL (ref 12–16)
IMM GRANULOCYTES # BLD AUTO: 0.07 K/UL (ref 0–0.04)
IMM GRANULOCYTES NFR BLD AUTO: 1.2 % (ref 0–0.5)
LYMPHOCYTES # BLD AUTO: 1.9 K/UL (ref 1–4.8)
LYMPHOCYTES NFR BLD: 31.2 % (ref 18–48)
MCH RBC QN AUTO: 27.4 PG (ref 27–31)
MCHC RBC AUTO-ENTMCNC: 31.5 G/DL (ref 32–36)
MCV RBC AUTO: 87 FL (ref 82–98)
MONOCYTES # BLD AUTO: 0.4 K/UL (ref 0.3–1)
MONOCYTES NFR BLD: 7.4 % (ref 4–15)
NEUTROPHILS # BLD AUTO: 3.4 K/UL (ref 1.8–7.7)
NEUTROPHILS NFR BLD: 57.7 % (ref 38–73)
NRBC BLD-RTO: 0 /100 WBC
PLATELET # BLD AUTO: 221 K/UL (ref 150–350)
PMV BLD AUTO: 11.4 FL (ref 9.2–12.9)
POC ACTIVATED CLOTTING TIME K: 208 SEC (ref 74–137)
POC ACTIVATED CLOTTING TIME K: 224 SEC (ref 74–137)
POC ACTIVATED CLOTTING TIME K: 274 SEC (ref 74–137)
RBC # BLD AUTO: 2.74 M/UL (ref 4–5.4)
SAMPLE: ABNORMAL
SITE: ABNORMAL
WBC # BLD AUTO: 5.93 K/UL (ref 3.9–12.7)

## 2020-04-20 PROCEDURE — 25000003 PHARM REV CODE 250: Performed by: HOSPITALIST

## 2020-04-20 PROCEDURE — 85025 COMPLETE CBC W/AUTO DIFF WBC: CPT

## 2020-04-20 PROCEDURE — 36415 COLL VENOUS BLD VENIPUNCTURE: CPT

## 2020-04-20 PROCEDURE — 94761 N-INVAS EAR/PLS OXIMETRY MLT: CPT

## 2020-04-20 PROCEDURE — 85730 THROMBOPLASTIN TIME PARTIAL: CPT

## 2020-04-20 PROCEDURE — 25000003 PHARM REV CODE 250: Performed by: INTERNAL MEDICINE

## 2020-04-20 PROCEDURE — 63600175 PHARM REV CODE 636 W HCPCS: Performed by: INTERNAL MEDICINE

## 2020-04-20 PROCEDURE — 11000001 HC ACUTE MED/SURG PRIVATE ROOM

## 2020-04-20 RX ORDER — ONDANSETRON 2 MG/ML
8 INJECTION INTRAMUSCULAR; INTRAVENOUS EVERY 6 HOURS PRN
Status: DISCONTINUED | OUTPATIENT
Start: 2020-04-20 | End: 2020-04-27 | Stop reason: HOSPADM

## 2020-04-20 RX ORDER — POLYETHYLENE GLYCOL 3350 17 G/17G
17 POWDER, FOR SOLUTION ORAL 2 TIMES DAILY PRN
Status: DISCONTINUED | OUTPATIENT
Start: 2020-04-20 | End: 2020-04-27 | Stop reason: HOSPADM

## 2020-04-20 RX ORDER — ACETAMINOPHEN 325 MG/1
650 TABLET ORAL EVERY 4 HOURS PRN
Status: DISCONTINUED | OUTPATIENT
Start: 2020-04-20 | End: 2020-04-27 | Stop reason: HOSPADM

## 2020-04-20 RX ADMIN — ASPIRIN 81 MG 81 MG: 81 TABLET ORAL at 08:04

## 2020-04-20 RX ADMIN — OXYCODONE HYDROCHLORIDE AND ACETAMINOPHEN 1 TABLET: 10; 325 TABLET ORAL at 10:04

## 2020-04-20 RX ADMIN — RIVAROXABAN 15 MG: 15 TABLET, FILM COATED ORAL at 05:04

## 2020-04-20 RX ADMIN — POLYETHYLENE GLYCOL 3350 17 G: 17 POWDER, FOR SOLUTION ORAL at 10:04

## 2020-04-20 RX ADMIN — ONDANSETRON HYDROCHLORIDE 8 MG: 4 SOLUTION ORAL at 10:04

## 2020-04-20 RX ADMIN — RIVAROXABAN 15 MG: 15 TABLET, FILM COATED ORAL at 10:04

## 2020-04-20 RX ADMIN — OXYCODONE HYDROCHLORIDE AND ACETAMINOPHEN 1 TABLET: 10; 325 TABLET ORAL at 11:04

## 2020-04-20 RX ADMIN — HEPARIN SODIUM 18 UNITS/KG/HR: 10000 INJECTION, SOLUTION INTRAVENOUS at 05:04

## 2020-04-20 RX ADMIN — ONDANSETRON HYDROCHLORIDE 8 MG: 4 SOLUTION ORAL at 06:04

## 2020-04-20 NOTE — NURSING
Remained free from fall and injury. PRN pain med given. Positioned and repositioned independently. Using bedpan. Patient in on RA. O2 sats 97% at rest. Safety precautions maintained and call light within reach.

## 2020-04-20 NOTE — ASSESSMENT & PLAN NOTE
Large thrombus likely related to COVID-19 infection  IR consulted. Underwent two sessions of thrombectomy/thrombolysis and stent placement in left internal iliac vein for severe stenosis not resolved with venoplasty. This was successful  Working with  to help patient with cost of NOAC she will need for at least 3 months  Is uninsured and does not have a SSN. I discussed with   Stop dilaudid PCA and started oral narcotic pain regimen with breakthrough IV morphine if needed.  Stop Heparin gtt and start Xarelto.  SW consulted for NOAC options.  PT evaluation.

## 2020-04-20 NOTE — NURSING
Patient PTT 33.4 at this time 30 units/kg bolus given drip rate increase by 2 units/kg, will continue to monitor for any changes in patient status. Repeat PTT in six hours.

## 2020-04-20 NOTE — PROGRESS NOTES
[facility] Hospital Medicine  Progress Note    Patient Name: Wayne Garces  MRN: [id number]  Patient Class: IP- Inpatient   Admission Date: 2020  Length of Stay: 4 days  Attending Physician: Zackery Mcdonald MD  Primary Care Provider: Primary Doctor No        Subjective:     Principal Problem:Acute deep vein thrombosis (DVT) of femoral vein of left lower extremity        HPI:  This is 24 y.o female with no medical history who presents to the hospital complaint of left leg pain for 4 days and progressively worsening for the last 2 days. Patient reports pain starting at her left thigh and radiating throughout the leg for the last 2 days. Associated symptoms are redness and swelling of left upper leg. Pain described as continuous aching 10/10 during the interview. She reports pain getting better if she raised her leg for the first 2 days but now nothing can help. She tried to take Tylenol at home but Tylenol doesn't help the leg pain just helps with her headache only. She finds she has descreased range of motion of the knee secondary to pain but denies any numbness in the leg. She reports she went to the hospital on 3/31/2020 because of fever, cough and body ache but sent home without Covid 19 testing. She has not been ambulating much at home secondary to body aches and fatigue. Denies any SOB, fever, chest pain, abdominal pain, n/v/d. No history of clotting disorder or DVT. Not on birth control pills. Denies smoking. Surgical history includes  section and tubal ligation per report.    In ED, US LLE vein shows extensive deep venous thrombosis involving the left common femoral vein, femoral vein, greater saphenous vein, popliteal vein, as well as anterior and posterior tibial veins and peroneal vein. EKG with sinus tachycardia 126 H/H 11.7/36.5; coagulation studies normal; Covid-19 positive.    History obtained via language line.    Overview/Hospital Course:  Ms Mcdonald  presented with left lower extremity swelling secondary to extensive deep vein thrombus extending from left iliac vein down to posterior tibial veins. ED consulted vascular surgery recommending pharmaco-mechanical thrombectomy and initiating heparin drip in the interim pending tentative procedure. Per discussion between ED physician and vascular surgeon was to transfer to [facility] [place] for procedure. Patient tested positive for COVID-19, which is potentially cause for acute thrombus. Seems that because of positive test, transfer was cancelled. IR consulted. Underwent 2 sessions of thrombectomy/thrombolysis. IR physician incidentally noted severe focal narrowing of the proximal left common iliac vein concerning for external venous compression. Underwent venoplasty without resolution of stenosis, therefore had stent placed. Stenosis successfully treated with this. Patient tolerated procedure very well and transferred back to ICU for close monitoring. Is on heparin infusion with plans to transition to NOAC. IR physician also recommends aspirin 81 mg daily indefinitely for stent. Patient is uninsured and has no SSN. Patient assistance program application submitted to assist with cost of NOAC which she needs for at least 3-6 months. Stepped down to floor 4/18    Interval History: Feeling better.    Review of Systems   HENT: Negative for ear discharge and ear pain.    Eyes: Negative for discharge and itching.   Endocrine: Negative for cold intolerance and heat intolerance.   Neurological: Negative for seizures and syncope.     Objective:     Vital Signs (Most Recent):  Temp: 99.1 °F (37.3 °C) (04/20/20 1132)  Pulse: 73 (04/20/20 1132)  Resp: 17 (04/20/20 1132)  BP: 130/66 (04/20/20 1132)  SpO2: 95 % (04/20/20 1132) Vital Signs (24h Range):  Temp:  [98.2 °F (36.8 °C)-99.1 °F (37.3 °C)] 99.1 °F (37.3 °C)  Pulse:  [] 73  Resp:  [17-19] 17  SpO2:  [95 %-98 %] 95 %  BP: (122-135)/(56-84) 130/66     Weight: 97.8 kg (215 lb  9.8 oz)  Body mass index is 37.01 kg/m².    Intake/Output Summary (Last 24 hours) at 4/20/2020 1545  Last data filed at 4/20/2020 1220  Gross per 24 hour   Intake 720 ml   Output 650 ml   Net 70 ml      Physical Exam   Constitutional: She is oriented to person, place, and time. She appears well-developed. No distress.   Cardiovascular: Normal rate and intact distal pulses.   Pulmonary/Chest: Effort normal and breath sounds normal.   Abdominal: Soft.   Neurological: She is alert and oriented to person, place, and time.   Skin: She is not diaphoretic.   Psychiatric: She has a normal mood and affect. Her behavior is normal. Judgment and thought content normal.   Nursing note and vitals reviewed.      Significant Labs:   BMP:   Recent Labs   Lab 04/19/20  0643   *      K 3.9      CO2 25   BUN 13   CREATININE 0.8   CALCIUM 9.0     CBC:   Recent Labs   Lab 04/19/20  0643 04/20/20  0408   WBC 7.16 5.93   HGB 7.8* 7.5*   HCT 24.1* 23.8*    221       Significant Imaging: I have reviewed all pertinent imaging results/findings within the past 24 hours.      Assessment/Plan:      * Acute deep vein thrombosis (DVT) of femoral vein of left lower extremity  Large thrombus likely related to COVID-19 infection  IR consulted. Underwent two sessions of thrombectomy/thrombolysis and stent placement in left internal iliac vein for severe stenosis not resolved with venoplasty. This was successful  Working with  to help patient with cost of NOAC she will need for at least 3 months  Is uninsured and does not have a SSN. I discussed with   Stop dilaudid PCA and started oral narcotic pain regimen with breakthrough IV morphine if needed.  Stop Heparin gtt and start Xarelto.  SW consulted for NOAC options.  PT evaluation.    Acute deep vein thrombosis (DVT) of iliac vein of left lower extremity  As above      Acute deep vein thrombosis (DVT) of distal vein of left lower extremity  See  above    Obesity (BMI 35.0-39.9 without comorbidity)  Recommend diet and lifestyle modifications outpatient    COVID-19 virus infection  Covid positive. Possibly contributory to DVT given risk for hyper coaguable state.   Respiratory status very stable      VTE Risk Mitigation (From admission, onward)         Ordered     rivaroxaban tablet 15 mg  2 times daily with meals      04/20/20 0855     IP VTE HIGH RISK PATIENT  Once      04/16/20 1951                      Zackery Mcdonald MD  Department of Hospital Medicine   [facility]

## 2020-04-20 NOTE — SUBJECTIVE & OBJECTIVE
Interval History: Feeling better.    Review of Systems   HENT: Negative for ear discharge and ear pain.    Eyes: Negative for discharge and itching.   Endocrine: Negative for cold intolerance and heat intolerance.   Neurological: Negative for seizures and syncope.     Objective:     Vital Signs (Most Recent):  Temp: 99.1 °F (37.3 °C) (04/20/20 1132)  Pulse: 73 (04/20/20 1132)  Resp: 17 (04/20/20 1132)  BP: 130/66 (04/20/20 1132)  SpO2: 95 % (04/20/20 1132) Vital Signs (24h Range):  Temp:  [98.2 °F (36.8 °C)-99.1 °F (37.3 °C)] 99.1 °F (37.3 °C)  Pulse:  [] 73  Resp:  [17-19] 17  SpO2:  [95 %-98 %] 95 %  BP: (122-135)/(56-84) 130/66     Weight: 97.8 kg (215 lb 9.8 oz)  Body mass index is 37.01 kg/m².    Intake/Output Summary (Last 24 hours) at 4/20/2020 1545  Last data filed at 4/20/2020 1220  Gross per 24 hour   Intake 720 ml   Output 650 ml   Net 70 ml      Physical Exam   Constitutional: She is oriented to person, place, and time. She appears well-developed. No distress.   Cardiovascular: Normal rate and intact distal pulses.   Pulmonary/Chest: Effort normal and breath sounds normal.   Abdominal: Soft.   Neurological: She is alert and oriented to person, place, and time.   Skin: She is not diaphoretic.   Psychiatric: She has a normal mood and affect. Her behavior is normal. Judgment and thought content normal.   Nursing note and vitals reviewed.      Significant Labs:   BMP:   Recent Labs   Lab 04/19/20  0643   *      K 3.9      CO2 25   BUN 13   CREATININE 0.8   CALCIUM 9.0     CBC:   Recent Labs   Lab 04/19/20  0643 04/20/20  0408   WBC 7.16 5.93   HGB 7.8* 7.5*   HCT 24.1* 23.8*    221       Significant Imaging: I have reviewed all pertinent imaging results/findings within the past 24 hours.

## 2020-04-21 LAB
BASOPHILS # BLD AUTO: 0.02 K/UL (ref 0–0.2)
BASOPHILS NFR BLD: 0.3 % (ref 0–1.9)
DIFFERENTIAL METHOD: ABNORMAL
EOSINOPHIL # BLD AUTO: 0.2 K/UL (ref 0–0.5)
EOSINOPHIL NFR BLD: 3.1 % (ref 0–8)
ERYTHROCYTE [DISTWIDTH] IN BLOOD BY AUTOMATED COUNT: 12.4 % (ref 11.5–14.5)
HCT VFR BLD AUTO: 23.7 % (ref 37–48.5)
HGB BLD-MCNC: 7.3 G/DL (ref 12–16)
IMM GRANULOCYTES # BLD AUTO: 0.11 K/UL (ref 0–0.04)
IMM GRANULOCYTES NFR BLD AUTO: 1.9 % (ref 0–0.5)
LYMPHOCYTES # BLD AUTO: 1.4 K/UL (ref 1–4.8)
LYMPHOCYTES NFR BLD: 24.7 % (ref 18–48)
MCH RBC QN AUTO: 26.6 PG (ref 27–31)
MCHC RBC AUTO-ENTMCNC: 30.8 G/DL (ref 32–36)
MCV RBC AUTO: 87 FL (ref 82–98)
MONOCYTES # BLD AUTO: 0.5 K/UL (ref 0.3–1)
MONOCYTES NFR BLD: 8.4 % (ref 4–15)
NEUTROPHILS # BLD AUTO: 3.5 K/UL (ref 1.8–7.7)
NEUTROPHILS NFR BLD: 61.6 % (ref 38–73)
NRBC BLD-RTO: 1 /100 WBC
PLATELET # BLD AUTO: 218 K/UL (ref 150–350)
PMV BLD AUTO: 11.7 FL (ref 9.2–12.9)
RBC # BLD AUTO: 2.74 M/UL (ref 4–5.4)
WBC # BLD AUTO: 5.74 K/UL (ref 3.9–12.7)

## 2020-04-21 PROCEDURE — 97165 OT EVAL LOW COMPLEX 30 MIN: CPT

## 2020-04-21 PROCEDURE — 97161 PT EVAL LOW COMPLEX 20 MIN: CPT

## 2020-04-21 PROCEDURE — 25000003 PHARM REV CODE 250: Performed by: HOSPITALIST

## 2020-04-21 PROCEDURE — 85025 COMPLETE CBC W/AUTO DIFF WBC: CPT

## 2020-04-21 PROCEDURE — 11000001 HC ACUTE MED/SURG PRIVATE ROOM

## 2020-04-21 PROCEDURE — 36415 COLL VENOUS BLD VENIPUNCTURE: CPT

## 2020-04-21 PROCEDURE — 25000003 PHARM REV CODE 250: Performed by: INTERNAL MEDICINE

## 2020-04-21 RX ADMIN — RIVAROXABAN 15 MG: 15 TABLET, FILM COATED ORAL at 05:04

## 2020-04-21 RX ADMIN — RIVAROXABAN 15 MG: 15 TABLET, FILM COATED ORAL at 08:04

## 2020-04-21 RX ADMIN — ASPIRIN 81 MG 81 MG: 81 TABLET ORAL at 08:04

## 2020-04-21 RX ADMIN — OXYCODONE HYDROCHLORIDE AND ACETAMINOPHEN 1 TABLET: 10; 325 TABLET ORAL at 01:04

## 2020-04-21 RX ADMIN — OXYCODONE HYDROCHLORIDE AND ACETAMINOPHEN 1 TABLET: 10; 325 TABLET ORAL at 09:04

## 2020-04-21 NOTE — NURSING
Remained free from fall and injury. PRN pain med given. Positioned and repositioned independently. Patient using bedpan. Up to chair. Safety precautions maintained and call light within reach.    Chart check completed.

## 2020-04-21 NOTE — PROGRESS NOTES
[facility] Hospital Medicine  Progress Note    Patient Name: Wayne Garces  MRN: [id number]  Patient Class: IP- Inpatient   Admission Date: 2020  Length of Stay: 5 days  Attending Physician: Melody Alba MD  Primary Care Provider: Primary Doctor No        Subjective:     Principal Problem:Acute deep vein thrombosis (DVT) of femoral vein of left lower extremity        HPI:  This is 24 y.o female with no medical history who presents to the hospital complaint of left leg pain for 4 days and progressively worsening for the last 2 days. Patient reports pain starting at her left thigh and radiating throughout the leg for the last 2 days. Associated symptoms are redness and swelling of left upper leg. Pain described as continuous aching 10/10 during the interview. She reports pain getting better if she raised her leg for the first 2 days but now nothing can help. She tried to take Tylenol at home but Tylenol doesn't help the leg pain just helps with her headache only. She finds she has descreased range of motion of the knee secondary to pain but denies any numbness in the leg. She reports she went to the hospital on 3/31/2020 because of fever, cough and body ache but sent home without Covid 19 testing. She has not been ambulating much at home secondary to body aches and fatigue. Denies any SOB, fever, chest pain, abdominal pain, n/v/d. No history of clotting disorder or DVT. Not on birth control pills. Denies smoking. Surgical history includes  section and tubal ligation per report.    In ED, US LLE vein shows extensive deep venous thrombosis involving the left common femoral vein, femoral vein, greater saphenous vein, popliteal vein, as well as anterior and posterior tibial veins and peroneal vein. EKG with sinus tachycardia 126 H/H 11.7/36.5; coagulation studies normal; Covid-19 positive.    History obtained via language line.    Overview/Hospital Course:  Ms Mcdonald  presented with left lower extremity swelling secondary to extensive deep vein thrombus extending from left iliac vein down to posterior tibial veins. ED consulted vascular surgery recommending pharmaco-mechanical thrombectomy and initiating heparin drip in the interim pending tentative procedure. Per discussion between ED physician and vascular surgeon was to transfer to [facility] [place] for procedure. Patient tested positive for COVID-19, which is potentially cause for acute thrombus. Seems that because of positive test, transfer was cancelled. IR consulted. Underwent 2 sessions of thrombectomy/thrombolysis. IR physician incidentally noted severe focal narrowing of the proximal left common iliac vein concerning for external venous compression. Underwent venoplasty without resolution of stenosis, therefore had stent placed. Stenosis successfully treated with this. Patient tolerated procedure very well and transferred back to ICU for close monitoring. Is on heparin infusion with plans to transition to NOAC. IR physician also recommends aspirin 81 mg daily indefinitely for stent. Patient is uninsured and has no SSN. Patient assistance program application submitted to assist with cost of NOAC which she needs for at least 3-6 months. Stepped down to floor 4/18,  Patient is stable on RA.  SW is consulted for Xarelto arrangement.      Interval History: Feeling better.    Review of Systems   HENT: Negative for ear discharge and ear pain.    Eyes: Negative for discharge and itching.   Endocrine: Negative for cold intolerance and heat intolerance.   Neurological: Negative for seizures and syncope.     Objective:     Vital Signs (Most Recent):  Temp: 98.9 °F (37.2 °C) (04/21/20 0713)  Pulse: 76 (04/21/20 0713)  Resp: 17 (04/21/20 0713)  BP: 133/76 (04/21/20 0713)  SpO2: 97 % (04/21/20 0713) Vital Signs (24h Range):  Temp:  [97.5 °F (36.4 °C)-99.9 °F (37.7 °C)] 98.9 °F (37.2 °C)  Pulse:  [73-85] 76  Resp:  [16-20] 17  SpO2:  [95  %-97 %] 97 %  BP: (123-133)/(59-76) 133/76     Weight: 97.8 kg (215 lb 9.8 oz)  Body mass index is 37.01 kg/m².    Intake/Output Summary (Last 24 hours) at 4/21/2020 1142  Last data filed at 4/21/2020 0430  Gross per 24 hour   Intake 600 ml   Output 1000 ml   Net -400 ml      Physical Exam   Constitutional: She is oriented to person, place, and time. She appears well-developed. No distress.   Cardiovascular: Normal rate and intact distal pulses.   Pulmonary/Chest: Effort normal and breath sounds normal.   Abdominal: Soft.   Neurological: She is alert and oriented to person, place, and time.   Skin: She is not diaphoretic.   Psychiatric: She has a normal mood and affect. Her behavior is normal. Judgment and thought content normal.   Nursing note and vitals reviewed.      Significant Labs:   BMP:   No results for input(s): GLU, NA, K, CL, CO2, BUN, CREATININE, CALCIUM, MG in the last 48 hours.  CBC:   Recent Labs   Lab 04/20/20  0408 04/21/20  0413   WBC 5.93 5.74   HGB 7.5* 7.3*   HCT 23.8* 23.7*    218       Significant Imaging: I have reviewed all pertinent imaging results/findings within the past 24 hours.      Assessment/Plan:      * Acute deep vein thrombosis (DVT) of femoral vein of left lower extremity  Large thrombus likely related to COVID-19 infection  IR consulted. Underwent two sessions of thrombectomy/thrombolysis and stent placement in left internal iliac vein for severe stenosis not resolved with venoplasty. This was successful  Working with  to help patient with cost of NOAC she will need for at least 3 months  Is uninsured and does not have a SSN. I discussed with   Stop dilaudid PCA and started oral narcotic pain regimen with breakthrough IV morphine if needed.  Stop Heparin gtt and start Xarelto.  SW consulted for NOAC options.  PT evaluation.    Acute deep vein thrombosis (DVT) of iliac vein of left lower extremity  As above      Acute deep vein thrombosis  (DVT) of distal vein of left lower extremity  See above    Obesity (BMI 35.0-39.9 without comorbidity)  Recommend diet and lifestyle modifications outpatient    COVID-19 virus infection  Covid positive. Possibly contributory to DVT given risk for hyper coaguable state.   Respiratory status very stable,on RA.      VTE Risk Mitigation (From admission, onward)         Ordered     rivaroxaban tablet 15 mg  2 times daily with meals      04/20/20 0855     IP VTE HIGH RISK PATIENT  Once      04/16/20 1951                      Melody Alba MD  Department of Hospital Medicine   [facility]

## 2020-04-21 NOTE — PT/OT/SLP EVAL
Physical Therapy Evaluation    Patient Name:  Wayne Garces   MRN:  [id number]    Recommendations:     Discharge Recommendations:  home   Discharge Equipment Recommendations: (TBD, may need RW)   Barriers to discharge: None    Assessment:     Wayne Garces is a 25 y.o. female admitted with a medical diagnosis of Acute deep vein thrombosis (DVT) of femoral vein of left lower extremity.  She presents with the following impairments/functional limitations:  impaired functional mobilty, gait instability, impaired balance, decreased lower extremity function, pain, decreased ROM, edema.    Rehab Prognosis: Good; patient would benefit from acute skilled PT services to address these deficits and reach maximum level of function.    Recent Surgery: Procedure(s) (LRB):  THROMBOLYSIS OR THROMBECTOMY, BLOOD VESSEL, LOWER EXTREMITY (Left) 4 Days Post-Op    Plan:     During this hospitalization, patient to be seen 5 x/week to address the identified rehab impairments via gait training, therapeutic activities, therapeutic exercises and progress toward the following goals:    · Plan of Care Expires:  05/05/20    Subjective     Chief Complaint: LLE pain with WB  Patient/Family Comments/goals: Pt agreeable to therapy.   Pain/Comfort:  · Pain Rating 1: (LLE pain)  · Pain Addressed 1: Pre-medicate for activity    Living Environment:  Per chart, pt lives with 6 relatives.  Prior to admission, patients level of function was independent.  Equipment used at home: none.  Upon discharge, patient will have assistance from family.    Objective:     Communicated with nurse Garibay prior to session.  Patient found HOB elevated with peripheral IV  upon PT entry to room.    General Precautions: Standard, fall   Orthopedic Precautions:N/A   Braces: N/A     Exams:  · Cognitive Exam:  Patient was able to follow simple commands, limited by Nepalese speaking only.  `  · Gross Motor Coordination:  WFL  · Postural Exam:  Patient  presented with the following abnormalities:    · -       obesity  · Skin Integrity/Edema:      · -       Skin integrity: Visible skin intact  · -       Edema: Severe LLE  · BLE ROM: RLE WNL, LLE decreased hip flex, knee flex, and ankle DF  · BLE Strength: WFL    Functional Mobility:  Pt mostly Pashto speaking, however able to follow simple commands to participate in therapy.  Pt with LLE pain limiting WB activities, required encouragement.    · Bed Mobility:     · Scooting: stand by assistance  · Supine to Sit: stand by assistance with HOB elevated; Pt required extra time 2* LLE pain.     · Transfers:     · Sit to Stand:  contact guard assistance and minimum assistance with no AD and hand-held assist  · Bed to Chair: contact guard assistance and minimum assistance with  no AD  using  Step Transfer  · Gait: Pt ambulated ~6 ft with min A with HHA.  Pt attempting to ambulate NWB with LLE, encouraged to increase WB to LLE.  Pt with decreased weight shifting, decreased L heel>toe push off, decreased foot clearance, decreased step length, and decreased eric.    · Balance: Pt with fair dynamic standing balance.       Therapeutic Activities and Exercises:  Balance Training  Static Standing:  Patient performed static standing on level surface  using B HHA for support with CGA and minimal verbal cues for upright posture, proper foot placement, and WB through LLE.     Dynamic Sitting/Standing:  Patient performed dynamic standing on level surface using B HHA with Minimal Assistance and minimal verbal cues during minimal excursions for forward weight shifting to encourage LLE WB and L ankle DF.    LLE seated therex: hip flex, LAQ/HS, and AP    AM-PAC 6 CLICK MOBILITY  Total Score:19     Patient left up in chair and LLE elevated with all lines intact, call button in reach and nurse Lyndsay notified.    GOALS:   Multidisciplinary Problems     Physical Therapy Goals        Problem: Physical Therapy Goal    Goal Priority  Disciplines Outcome Goal Variances Interventions   Physical Therapy Goal     PT, PT/OT Ongoing, Progressing     Description:  Goals to be met by: 20     Patient will increase functional independence with mobility by performin. Supine to sit with Modified [place]  2. Rolling to Left and Right with Modified [place]  3. Sit to stand transfer with Modified [place]  4. Bed to chair transfer with Modified [place]   5. Gait >50 feet with Modified [place] with or without appropriate AD  6. Lower extremity exercise program 3 sets x10 reps per handout, with independence                      History:     History reviewed. No pertinent past medical history.    Past Surgical History:   Procedure Laterality Date     SECTION      TUBAL LIGATION         Time Tracking:     PT Received On: 20  PT Start Time: 1548     PT Stop Time: 1601  PT Total Time (min): 13 min     Billable Minutes: Evaluation 13 min co-eval with OT      Saritha Crenshaw, PT  2020

## 2020-04-21 NOTE — SUBJECTIVE & OBJECTIVE
Interval History: Feeling better.    Review of Systems   HENT: Negative for ear discharge and ear pain.    Eyes: Negative for discharge and itching.   Endocrine: Negative for cold intolerance and heat intolerance.   Neurological: Negative for seizures and syncope.     Objective:     Vital Signs (Most Recent):  Temp: 98.9 °F (37.2 °C) (04/21/20 0713)  Pulse: 76 (04/21/20 0713)  Resp: 17 (04/21/20 0713)  BP: 133/76 (04/21/20 0713)  SpO2: 97 % (04/21/20 0713) Vital Signs (24h Range):  Temp:  [97.5 °F (36.4 °C)-99.9 °F (37.7 °C)] 98.9 °F (37.2 °C)  Pulse:  [73-85] 76  Resp:  [16-20] 17  SpO2:  [95 %-97 %] 97 %  BP: (123-133)/(59-76) 133/76     Weight: 97.8 kg (215 lb 9.8 oz)  Body mass index is 37.01 kg/m².    Intake/Output Summary (Last 24 hours) at 4/21/2020 1142  Last data filed at 4/21/2020 0430  Gross per 24 hour   Intake 600 ml   Output 1000 ml   Net -400 ml      Physical Exam   Constitutional: She is oriented to person, place, and time. She appears well-developed. No distress.   Cardiovascular: Normal rate and intact distal pulses.   Pulmonary/Chest: Effort normal and breath sounds normal.   Abdominal: Soft.   Neurological: She is alert and oriented to person, place, and time.   Skin: She is not diaphoretic.   Psychiatric: She has a normal mood and affect. Her behavior is normal. Judgment and thought content normal.   Nursing note and vitals reviewed.      Significant Labs:   BMP:   No results for input(s): GLU, NA, K, CL, CO2, BUN, CREATININE, CALCIUM, MG in the last 48 hours.  CBC:   Recent Labs   Lab 04/20/20  0408 04/21/20  0413   WBC 5.93 5.74   HGB 7.5* 7.3*   HCT 23.8* 23.7*    218       Significant Imaging: I have reviewed all pertinent imaging results/findings within the past 24 hours.

## 2020-04-21 NOTE — ASSESSMENT & PLAN NOTE
Covid positive. Possibly contributory to DVT given risk for hyper coaguable state.   Respiratory status very stable,on RA.

## 2020-04-21 NOTE — PROGRESS NOTES
ALLYSON f/u with [employer] prescription assistance program for status on patient's application.  ALLYSON spoke with Hollie who was unable to locate application.  ALLYSON refaxed application to [phone].    Sarah Noguera LMSW, CCM  4/21/20

## 2020-04-21 NOTE — PLAN OF CARE
Problem: Physical Therapy Goal  Goal: Physical Therapy Goal  Description  Goals to be met by: 20     Patient will increase functional independence with mobility by performin. Supine to sit with Modified [place]  2. Rolling to Left and Right with Modified [place]  3. Sit to stand transfer with Modified [place]  4. Bed to chair transfer with Modified [place]   5. Gait >50 feet with Modified [place] with or without appropriate AD  6. Lower extremity exercise program 3 sets x10 reps per handout, with independence     Outcome: Ongoing, Progressing    Pt ambulated ~6 ft with min A using no AD.

## 2020-04-22 LAB
BASOPHILS # BLD AUTO: 0.02 K/UL (ref 0–0.2)
BASOPHILS NFR BLD: 0.3 % (ref 0–1.9)
CRP SERPL-MCNC: 86.1 MG/L (ref 0–8.2)
DIFFERENTIAL METHOD: ABNORMAL
EOSINOPHIL # BLD AUTO: 0.1 K/UL (ref 0–0.5)
EOSINOPHIL NFR BLD: 2.1 % (ref 0–8)
ERYTHROCYTE [DISTWIDTH] IN BLOOD BY AUTOMATED COUNT: 13.2 % (ref 11.5–14.5)
HCT VFR BLD AUTO: 23.9 % (ref 37–48.5)
HGB BLD-MCNC: 7.6 G/DL (ref 12–16)
IMM GRANULOCYTES # BLD AUTO: 0.14 K/UL (ref 0–0.04)
IMM GRANULOCYTES NFR BLD AUTO: 2.1 % (ref 0–0.5)
LYMPHOCYTES # BLD AUTO: 0.9 K/UL (ref 1–4.8)
LYMPHOCYTES NFR BLD: 14.4 % (ref 18–48)
MCH RBC QN AUTO: 27.6 PG (ref 27–31)
MCHC RBC AUTO-ENTMCNC: 31.8 G/DL (ref 32–36)
MCV RBC AUTO: 87 FL (ref 82–98)
MONOCYTES # BLD AUTO: 0.5 K/UL (ref 0.3–1)
MONOCYTES NFR BLD: 8 % (ref 4–15)
NEUTROPHILS # BLD AUTO: 4.8 K/UL (ref 1.8–7.7)
NEUTROPHILS NFR BLD: 73.1 % (ref 38–73)
NRBC BLD-RTO: 1 /100 WBC
PLATELET # BLD AUTO: 237 K/UL (ref 150–350)
PMV BLD AUTO: 11.6 FL (ref 9.2–12.9)
RBC # BLD AUTO: 2.75 M/UL (ref 4–5.4)
WBC # BLD AUTO: 6.53 K/UL (ref 3.9–12.7)

## 2020-04-22 PROCEDURE — 85025 COMPLETE CBC W/AUTO DIFF WBC: CPT

## 2020-04-22 PROCEDURE — 25000003 PHARM REV CODE 250: Performed by: INTERNAL MEDICINE

## 2020-04-22 PROCEDURE — 86140 C-REACTIVE PROTEIN: CPT

## 2020-04-22 PROCEDURE — 11000001 HC ACUTE MED/SURG PRIVATE ROOM

## 2020-04-22 PROCEDURE — 25000003 PHARM REV CODE 250: Performed by: HOSPITALIST

## 2020-04-22 PROCEDURE — 97530 THERAPEUTIC ACTIVITIES: CPT

## 2020-04-22 PROCEDURE — 97530 THERAPEUTIC ACTIVITIES: CPT | Mod: CQ

## 2020-04-22 PROCEDURE — 36415 COLL VENOUS BLD VENIPUNCTURE: CPT

## 2020-04-22 PROCEDURE — 97110 THERAPEUTIC EXERCISES: CPT | Mod: CQ

## 2020-04-22 RX ADMIN — RIVAROXABAN 15 MG: 15 TABLET, FILM COATED ORAL at 05:04

## 2020-04-22 RX ADMIN — ASPIRIN 81 MG 81 MG: 81 TABLET ORAL at 08:04

## 2020-04-22 RX ADMIN — RIVAROXABAN 15 MG: 15 TABLET, FILM COATED ORAL at 08:04

## 2020-04-22 RX ADMIN — OXYCODONE HYDROCHLORIDE AND ACETAMINOPHEN 1 TABLET: 10; 325 TABLET ORAL at 03:04

## 2020-04-22 RX ADMIN — OXYCODONE HYDROCHLORIDE AND ACETAMINOPHEN 1 TABLET: 10; 325 TABLET ORAL at 04:04

## 2020-04-22 RX ADMIN — ACETAMINOPHEN 650 MG: 325 TABLET ORAL at 08:04

## 2020-04-22 NOTE — ASSESSMENT & PLAN NOTE
Covid positive. Possibly contributory to DVT given risk for hyper coaguable state.   Respiratory status very stable,on RA.  Afebrile.

## 2020-04-22 NOTE — SUBJECTIVE & OBJECTIVE
Interval History: Feeling better.    Review of Systems   HENT: Negative for ear discharge and ear pain.    Eyes: Negative for discharge and itching.   Endocrine: Negative for cold intolerance and heat intolerance.   Neurological: Negative for seizures and syncope.     Objective:     Vital Signs (Most Recent):  Temp: 98.9 °F (37.2 °C) (04/22/20 0743)  Pulse: 83 (04/22/20 0743)  Resp: 20 (04/22/20 0743)  BP: 123/68 (04/22/20 0743)  SpO2: 97 % (04/22/20 0743) Vital Signs (24h Range):  Temp:  [98.3 °F (36.8 °C)-100.2 °F (37.9 °C)] 98.9 °F (37.2 °C)  Pulse:  [] 83  Resp:  [17-20] 20  SpO2:  [90 %-99 %] 97 %  BP: (119-139)/(59-72) 123/68     Weight: 97.8 kg (215 lb 9.8 oz)  Body mass index is 37.01 kg/m².    Intake/Output Summary (Last 24 hours) at 4/22/2020 0921  Last data filed at 4/22/2020 0500  Gross per 24 hour   Intake 480 ml   Output 950 ml   Net -470 ml      Physical Exam   Constitutional: She is oriented to person, place, and time. She appears well-developed. No distress.   Cardiovascular: Normal rate and intact distal pulses.   Pulmonary/Chest: Effort normal and breath sounds normal.   Abdominal: Soft.   Neurological: She is alert and oriented to person, place, and time.   Skin: She is not diaphoretic.   Psychiatric: She has a normal mood and affect. Her behavior is normal. Judgment and thought content normal.   Nursing note and vitals reviewed.      Significant Labs:   BMP:   No results for input(s): GLU, NA, K, CL, CO2, BUN, CREATININE, CALCIUM, MG in the last 48 hours.  CBC:   Recent Labs   Lab 04/21/20  0413 04/22/20  0540   WBC 5.74 6.53   HGB 7.3* 7.6*   HCT 23.7* 23.9*    237       Significant Imaging: I have reviewed all pertinent imaging results/findings within the past 24 hours.

## 2020-04-22 NOTE — PLAN OF CARE
Problem: Physical Therapy Goal  Goal: Physical Therapy Goal  Description  Goals to be met by: 20     Patient will increase functional independence with mobility by performin. Supine to sit with Modified [place]  2. Rolling to Left and Right with Modified [place]  3. Sit to stand transfer with Modified [place]  4. Bed to chair transfer with Modified [place]   5. Gait >50 feet with Modified [place] with or without appropriate AD  6. Lower extremity exercise program 3 sets x10 reps per handout, with independence     Outcome: Ongoing, Progressing   Sup to sit with SBA. Sat EOB with good balance.  Transfer bed to [facility] with SBA with vcs for proper technique.  BLE Beau seated x 10 reps with vcs

## 2020-04-22 NOTE — PLAN OF CARE
Problem: Occupational Therapy Goal  Goal: Occupational Therapy Goal  Description  Goals to be met by: 05/06/20     Patient will increase functional independence with ADLs by performing:    LE Dressing with Supervision.  Grooming while standing at sink with Supervision.  Toileting from toilet with Supervision for hygiene and clothing management.   Step transfer with Supervision  Toilet transfer to toilet with Supervision.     Outcome: Ongoing, Progressing   Session limited 2* LLE pain, participated with encouragement.

## 2020-04-22 NOTE — PT/OT/SLP EVAL
Occupational Therapy   Evaluation    Name: Wayne Garces  MRN: [id number]  Admitting Diagnosis:  Acute deep vein thrombosis (DVT) of femoral vein of left lower extremity 5 Days Post-Op    Recommendations:     Discharge Recommendations: home  Discharge Equipment Recommendations:  (may need RW)  Barriers to discharge:  None    Assessment:     Wayne Garces is a 25 y.o. female with a medical diagnosis of Acute deep vein thrombosis (DVT) of femoral vein of left lower extremity. Performance deficits affecting function: impaired self care skills, impaired balance, decreased lower extremity function, decreased ROM, impaired skin, gait instability, pain.  Session limited 2* LLE pain, participated with encouragement.     Rehab Prognosis: Good; patient would benefit from acute skilled OT services to address these deficits and reach maximum level of function.       Plan:     Patient to be seen (3-5x/week) to address the above listed problems via self-care/home management, therapeutic activities, therapeutic exercises  · Plan of Care Expires: 05/06/20  · Plan of Care Reviewed with: patient    Subjective     Chief Complaint: LLE pain with WB   Patient/Family Comments/goals: agreeable to sit up in the chair     Occupational Profile:  Living Environment: Per chart, pt lives with 6 relatives.  Previous level of function: independent   Equipment Used at Home:  none  Assistance upon Discharge: family     Pain/Comfort:  · Pain Rating 1: (LLE pain)  · Pain Addressed 1: Pre-medicate for activity       Patients cultural, spiritual, Scientology conflicts given the current situation: no    Objective:     Communicated with: nurseLani, prior to session.  Patient found HOB elevated with peripheral IV upon OT entry to room.    General Precautions: Standard, fall   Orthopedic Precautions:N/A   Braces:       Occupational Performance:    Bed Mobility:    · Patient completed Scooting/Bridging with stand by  assistance  · Patient completed Supine to Sit with stand by assistance and increased time required d/t LLE pain     Functional Mobility/Transfers:  · Patient completed Sit <> Stand Transfer with minimum assistance  with  hand-held assist   · Patient completed Bed <> Chair Transfer using Step Transfer technique with minimum assistance with hand-held assist  · Functional Mobility: Pt was hesitant with steps, trying to keep LLE NWB. Pt required increased time and encouragement for LLE WB. MIN A with short steps and HHA.     Activities of Daily Living:  · Lower Body Dressing: total assistance with donning socks    Cognitive/Visual Perceptual:  Cognitive/Psychosocial Skills:     -       Oriented to: Person, Place, Time and Situation   -       Follows Commands/attention:Follows multistep  commands  -       Communication: clear/fluent and German speaking, but able to make needs known and follow simple commands  -       Memory: No Deficits noted  -       Safety awareness/insight to disability: intact   -       Mood/Affect/Coping skills/emotional control: Appropriate to situation  Visual/Perceptual:      -Intact      Physical Exam:  Balance:    -       sitting: independence; standing: MIN A with HHA  Postural examination/scapula alignment:    -       No postural abnormalities identified  Skin integrity: Visible skin intact  Edema:  Severe LLE  Sensation:    -       Intact  light/touch BUE  Upper Extremity Range of Motion:     -       Right Upper Extremity: WNL  -       Left Upper Extremity: WNL  Upper Extremity Strength:    -       Right Upper Extremity: WNL  -       Left Upper Extremity: WNL   Strength:    -       Right Upper Extremity: WNL  -       Left Upper Extremity: WNL  Fine Motor Coordination:    -       Intact  Gross motor coordination:   minimally impaired 2* pain    AMPAC 6 Click ADL:  AMPAC Total Score:      Treatment & Education:  · Pt educated on OT role/POC.   · Importance of OOB activity with staff  assistance and to sit up in the chair daily with LLE elevated.  · Safety during functional t/f and mobility   · Multiple self-care tasks/functional mobility completed- assistance level noted above   · All questions/concerns answered within OT scope of practice     Education:    Patient left up in chair with all lines intact, call button in reach and charge nurse, Lyndsay, notified    GOALS:   Multidisciplinary Problems     Occupational Therapy Goals        Problem: Occupational Therapy Goal    Goal Priority Disciplines Outcome Interventions   Occupational Therapy Goal     OT, PT/OT Ongoing, Progressing    Description:  Goals to be met by: 20     Patient will increase functional independence with ADLs by performing:    LE Dressing with Supervision.  Grooming while standing at sink with Supervision.  Toileting from toilet with Supervision for hygiene and clothing management.   Step transfer with Supervision  Toilet transfer to toilet with Supervision.                      History:     History reviewed. No pertinent past medical history.    Past Surgical History:   Procedure Laterality Date     SECTION      TUBAL LIGATION         Time Tracking:     OT Date of Treatment: 20  OT Start Time: 1548  OT Stop Time: 1601  OT Total Time (min): 13 min    Billable Minutes:Evaluation 13 min (co-eval with PT)    Evette Hogue OT  2020

## 2020-04-22 NOTE — PT/OT/SLP PROGRESS
Occupational Therapy   Treatment    Name: Wayne Garces  MRN: [id number]  Admitting Diagnosis:  Acute deep vein thrombosis (DVT) of femoral vein of left lower extremity  5 Days Post-Op    Recommendations:     Discharge Recommendations: home  Discharge Equipment Recommendations:  walker, rolling  Barriers to discharge:  None    Assessment:     Wayne Garces is a 25 y.o. female with a medical diagnosis of Acute deep vein thrombosis (DVT) of femoral vein of left lower extremity. Performance deficits affecting function are impaired self care skills, impaired balance, decreased safety awareness, decreased ROM, gait instability, pain, decreased lower extremity function, impaired functional mobilty.   Pt continues to be limited 2* pain and avoiding WB to LLE- required increased cueing. Pt refused LLE ankle pumps. C/o tearful with mobility, nauseous, and some dizziness.     Pt took ~5-6 steps bed>chair with RW and CGA, prolonged time required with each step d/t pain.     Rehab Prognosis:  Good; patient would benefit from acute skilled OT services to address these deficits and reach maximum level of function.       Plan:     Patient to be seen (3-5x/week) to address the above listed problems via self-care/home management, therapeutic exercises, therapeutic activities  · Plan of Care Expires: 05/06/20  · Plan of Care Reviewed with: patient    Subjective     Chief complaint: nauseous and dizzy from short steps   Patient's comments/goals: some screams of pain with minimal WB     Rory from [employer] was used for translation.     Pain/Comfort:  · Pain Rating 1: (LLE pain)  · Pain Addressed 1: Pre-medicate for activity, Reposition, Distraction, Nurse notified, Cessation of Activity    Objective:     Communicated with: nurseJeanette, prior to session. Patient found HOB elevated with peripheral IV upon OT entry to room.    General Precautions: Standard, fall   Orthopedic Precautions:N/A   Braces: N/A      Occupational Performance:     Bed Mobility:  c/o LLE pain dangling EOB   · Patient completed Scooting/Bridging with supervision  · Patient completed Supine to Sit with supervision     Functional Mobility/Transfers:  · Patient completed Sit <> Stand Transfer with contact guard assistance  with  rolling walker   · Patient completed Bed <> Chair Transfer using Step Transfer technique with contact guard assistance with rolling walker  · Functional Mobility: Pt took 5-6 steps with ability to WB to LLE with prolonged time required with CGA and RW. Pt unable to stand to complete ADLs at the sink. Pt only wanted to ambulate with RW.     Activities of Daily Living:  · Grooming: set-up with brushing teeth seated; pt unable to do this standing   · Upper Body Dressing: set-up with donning back gown        AMPAC 6 Click ADL: 22    Treatment & Education:  · Pt re-educated on OT role/POC   · Max encouragement to sit up in the chair throughout the day and use of BSC with nursing staff; discussed with pt about importance of using BSC and no longer using the bed pan. Pt will require reinforcement with all staff; OT dropped off a BSC, informed nurse and PCT, and updated treatment sticky note in patient's chart to increase pt's functional mobility   · Importance of OOB activity with staff assistance  · Safety during functional t/f and mobility   · Multiple self-care tasks/functional mobility completed- assistance level noted above   · Pt required prolonged time with all aspects of functional mobility and sitting EOB prior to t/f d/t pain  · All questions/concerns answered within OT scope of practice       Patient left up in chair with all lines intact, call button in reach, nurseJeanette, and PCTChrissy, notified and all needs within reach Education:      GOALS:   Multidisciplinary Problems     Occupational Therapy Goals        Problem: Occupational Therapy Goal    Goal Priority Disciplines Outcome Interventions   Occupational  Therapy Goal     OT, PT/OT Ongoing, Progressing    Description:  Goals to be met by: 05/06/20     Patient will increase functional independence with ADLs by performing:    LE Dressing with Supervision.  Grooming while standing at sink with Supervision.  Toileting from toilet with Supervision for hygiene and clothing management.   Step transfer with Supervision  Toilet transfer to toilet with Supervision.                      Time Tracking:     OT Date of Treatment: 04/22/20  OT Start Time: 1015  OT Stop Time: 1050  OT Total Time (min): 35 min    Billable Minutes:Therapeutic Activity 35 min    Evette Hogue OT  4/22/2020

## 2020-04-22 NOTE — PT/OT/SLP PROGRESS
"Physical Therapy Treatment    Patient Name:  Wayne Garces   MRN:  [id number]    Recommendations:     Discharge Recommendations:  home   Discharge Equipment Recommendations: walker, rolling   Barriers to discharge: None    Assessment:     Wayne Garces is a 25 y.o. female admitted with a medical diagnosis of Acute deep vein thrombosis (DVT) of femoral vein of left lower extremity.  She presents with the following impairments/functional limitations:  weakness, gait instability, decreased ROM, impaired endurance, impaired balance, decreased lower extremity function, impaired joint extensibility, decreased safety awareness, impaired self care skills, pain, impaired functional mobilty, edema . Pt was unable to flex Knee for knee flexion or ankle to perform ankle pumps 2/2 to pain.   Pt has limited functional mobility 2/2 to increased pain and decreased ROM in L ankle.    Rehab Prognosis: Good; patient would benefit from acute skilled PT services to address these deficits and reach maximum level of function.    Recent Surgery: Procedure(s) (LRB):  THROMBOLYSIS OR THROMBECTOMY, BLOOD VESSEL, LOWER EXTREMITY (Left) 5 Days Post-Op    Plan:     During this hospitalization, patient to be seen 5 x/week to address the identified rehab impairments via gait training, therapeutic activities, therapeutic exercises and progress toward the following goals:    · Plan of Care Expires:  05/05/20    Subjective     Chief Complaint: pain  Patient/Family Comments/goals: "I have a headache. i'm hot and irritated."  Pain/Comfort:  · Pain Rating 1: 7/10  · Location - Side 1: Left  · Location - Orientation 1: lower  · Location 1: leg  · Pain Addressed 1: Pre-medicate for activity, Reposition, Distraction, Cessation of Activity, Nurse notified  · Pain Rating Post-Intervention 1: 7/10      Objective:     Communicated with beth Castañeda prior to session.  Patient found HOB elevated with peripheral IV upon PT entry to room. "     General Precautions: Standard, fall   Orthopedic Precautions:N/A   Braces: N/A     Functional Mobility:  · Bed Mobility:     · Scooting: modified independence  · Supine to Sit: supervision and stand by assistance  · Sit to Supine: stand by assistance  · Transfers:     · Sit to Stand:  stand by assistance and vcs for proper technique and safety with no AD and armrest of bsc  · Bed to/from BSC: stand by assistance with  no AD  using  Stand Pivot  · Balance: F with static/dynamic standing      AM-PAC 6 CLICK MOBILITY  Turning over in bed (including adjusting bedclothes, sheets and blankets)?: 4  Sitting down on and standing up from a chair with arms (e.g., wheelchair, bedside commode, etc.): 4  Moving from lying on back to sitting on the side of the bed?: 4  Moving to and from a bed to a chair (including a wheelchair)?: 3  Need to walk in hospital room?: 3  Climbing 3-5 steps with a railing?: 2  Basic Mobility Total Score: 20       Therapeutic Activities and Exercises:  Using ; BLE TE seated x 10 reps; LAQ, HIP FLEXION, HS, HR, TT  Handout given    Patient left HOB elevated with call button in reach and nurse notified..    GOALS:   Multidisciplinary Problems     Physical Therapy Goals        Problem: Physical Therapy Goal    Goal Priority Disciplines Outcome Goal Variances Interventions   Physical Therapy Goal     PT, PT/OT Ongoing, Progressing     Description:  Goals to be met by: 20     Patient will increase functional independence with mobility by performin. Supine to sit with Modified [place]  2. Rolling to Left and Right with Modified [place]  3. Sit to stand transfer with Modified [place]  4. Bed to chair transfer with Modified [place]   5. Gait >50 feet with Modified [place] with or without appropriate AD  6. Lower extremity exercise program 3 sets x10 reps per handout, with independence                      Time Tracking:     PT Received On:  04/22/20  PT Start Time: 1445     PT Stop Time: 1510  PT Total Time (min): 25 min     Billable Minutes: Therapeutic Activity 10 and Therapeutic Exercise 15    Treatment Type: Treatment  PT/PTA: ANKIT Sargent PTA  04/22/2020

## 2020-04-22 NOTE — PLAN OF CARE
Problem: Occupational Therapy Goal  Goal: Occupational Therapy Goal  Description  Goals to be met by: 05/06/20     Patient will increase functional independence with ADLs by performing:    LE Dressing with Supervision.  Grooming while standing at sink with Supervision.  Toileting from toilet with Supervision for hygiene and clothing management.   Step transfer with Supervision  Toilet transfer to toilet with Supervision.     4/22/2020 1439 by Evette Hogue OT  Outcome: Ongoing, Progressing  Pt continues to be limited 2* pain and avoiding WB to LLE- required increased cueing. Pt refused LLE ankle pumps. C/o tearful with mobility, nauseous, and some dizziness.   Pt took ~5-6 steps bed>chair with RW and CGA, prolonged time required with each step d/t pain.

## 2020-04-22 NOTE — NURSING
Pt remained free from fall and injuries. Pt aaox4. PRN pain medication given. Safety maintained. Call light in reach. Will continue to monitor.

## 2020-04-22 NOTE — PROGRESS NOTES
[facility] Hospital Medicine  Progress Note    Patient Name: Wayne Garces  MRN: [id number]  Patient Class: IP- Inpatient   Admission Date: 2020  Length of Stay: 6 days  Attending Physician: Melody Alba MD  Primary Care Provider: Primary Doctor No        Subjective:     Principal Problem:Acute deep vein thrombosis (DVT) of femoral vein of left lower extremity        HPI:  This is 24 y.o female with no medical history who presents to the hospital complaint of left leg pain for 4 days and progressively worsening for the last 2 days. Patient reports pain starting at her left thigh and radiating throughout the leg for the last 2 days. Associated symptoms are redness and swelling of left upper leg. Pain described as continuous aching 10/10 during the interview. She reports pain getting better if she raised her leg for the first 2 days but now nothing can help. She tried to take Tylenol at home but Tylenol doesn't help the leg pain just helps with her headache only. She finds she has descreased range of motion of the knee secondary to pain but denies any numbness in the leg. She reports she went to the hospital on 3/31/2020 because of fever, cough and body ache but sent home without Covid 19 testing. She has not been ambulating much at home secondary to body aches and fatigue. Denies any SOB, fever, chest pain, abdominal pain, n/v/d. No history of clotting disorder or DVT. Not on birth control pills. Denies smoking. Surgical history includes  section and tubal ligation per report.    In ED, US LLE vein shows extensive deep venous thrombosis involving the left common femoral vein, femoral vein, greater saphenous vein, popliteal vein, as well as anterior and posterior tibial veins and peroneal vein. EKG with sinus tachycardia 126 H/H 11.7/36.5; coagulation studies normal; Covid-19 positive.    History obtained via language line.    Overview/Hospital Course:  Ms Mcdonald  presented with left lower extremity swelling secondary to extensive deep vein thrombus extending from left iliac vein down to posterior tibial veins. ED consulted vascular surgery recommending pharmaco-mechanical thrombectomy and initiating heparin drip in the interim pending tentative procedure. Per discussion between ED physician and vascular surgeon was to transfer to [facility] [place] for procedure. Patient tested positive for COVID-19, which is potentially cause for acute thrombus. Seems that because of positive test, transfer was cancelled. IR consulted. Underwent 2 sessions of thrombectomy/thrombolysis. IR physician incidentally noted severe focal narrowing of the proximal left common iliac vein concerning for external venous compression. Underwent venoplasty without resolution of stenosis, therefore had stent placed. Stenosis successfully treated with this. Patient tolerated procedure very well and transferred back to ICU for close monitoring. Is on heparin infusion with plans to transition to NOAC. IR physician also recommends aspirin 81 mg daily indefinitely for stent. Patient is uninsured and has no SSN. Patient assistance program application submitted to assist with cost of NOAC which she needs for at least 3-6 months. Stepped down to floor 4/18,  Patient is stable on RA.  SW is consulted for Xarelto arrangement.  Afebrile.    Interval History: Feeling better.    Review of Systems   HENT: Negative for ear discharge and ear pain.    Eyes: Negative for discharge and itching.   Endocrine: Negative for cold intolerance and heat intolerance.   Neurological: Negative for seizures and syncope.     Objective:     Vital Signs (Most Recent):  Temp: 98.9 °F (37.2 °C) (04/22/20 0743)  Pulse: 83 (04/22/20 0743)  Resp: 20 (04/22/20 0743)  BP: 123/68 (04/22/20 0743)  SpO2: 97 % (04/22/20 0743) Vital Signs (24h Range):  Temp:  [98.3 °F (36.8 °C)-100.2 °F (37.9 °C)] 98.9 °F (37.2 °C)  Pulse:  [] 83  Resp:  [17-20]  20  SpO2:  [90 %-99 %] 97 %  BP: (119-139)/(59-72) 123/68     Weight: 97.8 kg (215 lb 9.8 oz)  Body mass index is 37.01 kg/m².    Intake/Output Summary (Last 24 hours) at 4/22/2020 0921  Last data filed at 4/22/2020 0500  Gross per 24 hour   Intake 480 ml   Output 950 ml   Net -470 ml      Physical Exam   Constitutional: She is oriented to person, place, and time. She appears well-developed. No distress.   Cardiovascular: Normal rate and intact distal pulses.   Pulmonary/Chest: Effort normal and breath sounds normal.   Abdominal: Soft.   Neurological: She is alert and oriented to person, place, and time.   Skin: She is not diaphoretic.   Psychiatric: She has a normal mood and affect. Her behavior is normal. Judgment and thought content normal.   Nursing note and vitals reviewed.      Significant Labs:   BMP:   No results for input(s): GLU, NA, K, CL, CO2, BUN, CREATININE, CALCIUM, MG in the last 48 hours.  CBC:   Recent Labs   Lab 04/21/20  0413 04/22/20  0540   WBC 5.74 6.53   HGB 7.3* 7.6*   HCT 23.7* 23.9*    237       Significant Imaging: I have reviewed all pertinent imaging results/findings within the past 24 hours.      Assessment/Plan:      * Acute deep vein thrombosis (DVT) of femoral vein of left lower extremity  Large thrombus likely related to COVID-19 infection  IR consulted. Underwent two sessions of thrombectomy/thrombolysis and stent placement in left internal iliac vein for severe stenosis not resolved with venoplasty. This was successful  Working with  to help patient with cost of NOAC she will need for at least 3 months  Is uninsured and does not have a SSN. I discussed with   Stop dilaudid PCA and started oral narcotic pain regimen with breakthrough IV morphine if needed.  Stop Heparin gtt and start Xarelto.  SW consulted for NOAC options.  PT evaluation.    Acute deep vein thrombosis (DVT) of iliac vein of left lower extremity  As above      Acute deep vein  thrombosis (DVT) of distal vein of left lower extremity  See above    Obesity (BMI 35.0-39.9 without comorbidity)  Recommend diet and lifestyle modifications outpatient    COVID-19 virus infection  Covid positive. Possibly contributory to DVT given risk for hyper coaguable state.   Respiratory status very stable,on RA.  Afebrile.      VTE Risk Mitigation (From admission, onward)         Ordered     rivaroxaban tablet 15 mg  2 times daily with meals      04/20/20 0855     IP VTE HIGH RISK PATIENT  Once      04/16/20 1951                      Melody Alba MD  Department of Hospital Medicine   [facility]

## 2020-04-23 LAB
BASOPHILS # BLD AUTO: 0.01 K/UL (ref 0–0.2)
BASOPHILS NFR BLD: 0.1 % (ref 0–1.9)
DIFFERENTIAL METHOD: ABNORMAL
EOSINOPHIL # BLD AUTO: 0.2 K/UL (ref 0–0.5)
EOSINOPHIL NFR BLD: 2.3 % (ref 0–8)
ERYTHROCYTE [DISTWIDTH] IN BLOOD BY AUTOMATED COUNT: 13.5 % (ref 11.5–14.5)
HCT VFR BLD AUTO: 23.8 % (ref 37–48.5)
HGB BLD-MCNC: 7.4 G/DL (ref 12–16)
IMM GRANULOCYTES # BLD AUTO: 0.19 K/UL (ref 0–0.04)
IMM GRANULOCYTES NFR BLD AUTO: 2.6 % (ref 0–0.5)
LYMPHOCYTES # BLD AUTO: 1.3 K/UL (ref 1–4.8)
LYMPHOCYTES NFR BLD: 17.3 % (ref 18–48)
MCH RBC QN AUTO: 27.3 PG (ref 27–31)
MCHC RBC AUTO-ENTMCNC: 31.1 G/DL (ref 32–36)
MCV RBC AUTO: 88 FL (ref 82–98)
MONOCYTES # BLD AUTO: 0.6 K/UL (ref 0.3–1)
MONOCYTES NFR BLD: 8.6 % (ref 4–15)
NEUTROPHILS # BLD AUTO: 5.1 K/UL (ref 1.8–7.7)
NEUTROPHILS NFR BLD: 69.1 % (ref 38–73)
NRBC BLD-RTO: 0 /100 WBC
PLATELET # BLD AUTO: 227 K/UL (ref 150–350)
PMV BLD AUTO: 11.5 FL (ref 9.2–12.9)
RBC # BLD AUTO: 2.71 M/UL (ref 4–5.4)
WBC # BLD AUTO: 7.34 K/UL (ref 3.9–12.7)

## 2020-04-23 PROCEDURE — 25000003 PHARM REV CODE 250: Performed by: INTERNAL MEDICINE

## 2020-04-23 PROCEDURE — 25000003 PHARM REV CODE 250: Performed by: HOSPITALIST

## 2020-04-23 PROCEDURE — 11000001 HC ACUTE MED/SURG PRIVATE ROOM

## 2020-04-23 PROCEDURE — 36415 COLL VENOUS BLD VENIPUNCTURE: CPT

## 2020-04-23 PROCEDURE — 94761 N-INVAS EAR/PLS OXIMETRY MLT: CPT

## 2020-04-23 PROCEDURE — 97535 SELF CARE MNGMENT TRAINING: CPT

## 2020-04-23 PROCEDURE — 85025 COMPLETE CBC W/AUTO DIFF WBC: CPT

## 2020-04-23 PROCEDURE — 97110 THERAPEUTIC EXERCISES: CPT | Mod: CQ

## 2020-04-23 PROCEDURE — 97802 MEDICAL NUTRITION INDIV IN: CPT

## 2020-04-23 PROCEDURE — 97116 GAIT TRAINING THERAPY: CPT | Mod: CQ

## 2020-04-23 RX ADMIN — ACETAMINOPHEN 650 MG: 325 TABLET ORAL at 02:04

## 2020-04-23 RX ADMIN — ASPIRIN 81 MG 81 MG: 81 TABLET ORAL at 08:04

## 2020-04-23 RX ADMIN — RIVAROXABAN 15 MG: 15 TABLET, FILM COATED ORAL at 06:04

## 2020-04-23 RX ADMIN — OXYCODONE HYDROCHLORIDE AND ACETAMINOPHEN 1 TABLET: 10; 325 TABLET ORAL at 12:04

## 2020-04-23 RX ADMIN — OXYCODONE HYDROCHLORIDE AND ACETAMINOPHEN 1 TABLET: 10; 325 TABLET ORAL at 11:04

## 2020-04-23 RX ADMIN — RIVAROXABAN 15 MG: 15 TABLET, FILM COATED ORAL at 08:04

## 2020-04-23 NOTE — PT/OT/SLP PROGRESS
Occupational Therapy   Treatment    Name: Wayne Garces  MRN: [id number]  Admitting Diagnosis:  Acute deep vein thrombosis (DVT) of femoral vein of left lower extremity  6 Days Post-Op    Recommendations:     Discharge Recommendations: home  Discharge Equipment Recommendations:  walker, rolling  Barriers to discharge:  None    Assessment:     Wayne Garces is a 25 y.o. female with a medical diagnosis of Acute deep vein thrombosis (DVT) of femoral vein of left lower extremity. Performance deficits affecting function are weakness, impaired self care skills, impaired balance, decreased ROM, impaired skin, impaired functional mobilty, gait instability, decreased lower extremity function, edema, pain.       Rehab Prognosis:  Good; patient would benefit from acute skilled OT services to address these deficits and reach maximum level of function.       Plan:     Patient to be seen (3-5x/week) to address the above listed problems via self-care/home management, therapeutic activities, therapeutic exercises  · Plan of Care Expires: 05/06/20  · Plan of Care Reviewed with: patient    Subjective     Agreeable to participate despite pain to LLE    Pain/Comfort:  · Pain Rating 1: (10/10 initially dangling and WB)  · Pain Addressed 1: Distraction, Reposition    Objective:     Communicated with: nurseSil, prior to session.  Patient found up in chair with LLE elevated with peripheral IV upon OT entry to room.    General Precautions: Standard, fall   Orthopedic Precautions:N/A   Braces: N/A     Occupational Performance:     Bed Mobility:    · Patient completed Scooting/Bridging with modified independence  · Patient completed Sit to Supine with modified independence     Functional Mobility/Transfers:  · Patient completed Sit <> Stand Transfer with stand by assistance  with  rolling walker   · Functional Mobility: Pt initially stood without RW, but upon standing pt's c/o LLE WB pain and requested RW. Pt  self-limiting with LLE WB. Pt non-WB to LLE with RW from bed>chair d/t pain; pt able to WBAT while standing at the sink for grooming then WBAT from sink>bed with prolonged time required d/t pain.     Activities of Daily Living:  · Grooming: stand by assistance standing at the sink to brush her teeth. OT positioned ADL objects on L side of sink to promote increased WB to LLE.      [facility] 6 Click ADL: 22    Treatment & Education:  · Pt educated on OT role/POC.   · Importance of OOB activity with staff assistance.  · Safety during functional t/f and mobility   · 10 reps of LLE ankle pumps with tactile cueing for increased ROM. Still not full ROM.   · Self-care tasks/functional mobility completed- assistance level noted above   · All questions/concerns answered within OT scope of practice       Patient left HOB elevated with all lines intact, call button in reach, nurseSil, notified and PCT presentEducation:      GOALS:   Multidisciplinary Problems     Occupational Therapy Goals        Problem: Occupational Therapy Goal    Goal Priority Disciplines Outcome Interventions   Occupational Therapy Goal     OT, PT/OT Ongoing, Progressing    Description:  Goals to be met by: 05/06/20     Patient will increase functional independence with ADLs by performing:    LE Dressing with Supervision.  Grooming while standing at sink with Supervision.  Toileting from toilet with Supervision for hygiene and clothing management.   Step transfer with Supervision  Toilet transfer to toilet with Supervision.                      Time Tracking:     OT Date of Treatment: 04/23/20  OT Start Time: 1545  OT Stop Time: 1557  OT Total Time (min): 12 min    Billable Minutes:Self Care/Home Management 12 min    Evette Hogue OT  4/23/2020

## 2020-04-23 NOTE — PROGRESS NOTES
[facility]  Adult Nutrition  Progress Note    SUMMARY       Recommendations    1. Continue current Reguar diet.  2. Encourage good po intake.   Goals: Pt to meet >/= 75% of EEN/EPN.  Nutrition Goal Status: new  Communication of RD Recs: (plan of care)    Reason for Assessment    Reason For Assessment: length of stay  Diagnosis: (COVID-19)  Relevant Medical History: obesity  General Information Comments: 24 y.o female with obesity presents to the hospital complaint of left leg pain for 4 days and progressively worsening for the last 2 days. Patient reports pain starting at her left thigh and radiating throughout the leg for the last 2 days. Associated symptoms are redness and swelling of left upper leg. Pain described as continuous aching 10/10. She reports pain getting better if she raised her leg for the first 2 days but now nothing can help. She tried to take Tylenol at home but Tylenol doesn't help the leg pain just helps with her headache only. She finds she has decreased range of motion of the knee secondary to pain but denies any numbness in the leg. She reports she went to the hospital on 3/31/2020 because of fever, cough and body ache but sent home without COVID-19 testing. She has not been ambulating much at home secondary to body aches and fatigue. Denies any SOB, fever, chest pain, abdominal pain, n/v/d. No history of clotting disorder or DVT. Not on birth control pills. Denies smoking. In ED, US LLE vein shows extensive deep venous thrombosis involving the left common femoral vein, femoral vein, greater saphenous vein, popliteal vein, as well as anterior and posterior tibial veins and peroneal vein. COVID-19 positive on 4/16. Due to recent hospital wide restrictions to limit the transfer of COVID-19, we are not performing any physical exams at this time. All S/S will be observational; NFPE to be performed at a future date. No face to face contact needed at this time.   Nutrition  "Discharge Planning: Adequate po intake via Regular diet.     Nutrition Risk Screen    Nutrition Risk Screen: no indicators present    Nutrition/Diet History    Spiritual, Cultural Beliefs, Mormonism Practices, Values that Affect Care: no  Factors Affecting Nutritional Intake: None identified at this time    Anthropometrics    Temp: 98.7 °F (37.1 °C)  Height Method: Stated  Height: 5' 4" (162.6 cm)  Height (inches): 64 in  Weight Method: Bed Scale  Weight: 97.8 kg (215 lb 9.8 oz)  Weight (lb): 215.61 lb  Ideal Body Weight (IBW), Female: 120 lb  % Ideal Body Weight, Female (lb): 179.68 %  BMI (Calculated): 37  BMI Grade: 35 - 39.9 - obesity - grade II    Lab/Procedures/Meds    Pertinent Labs Reviewed: reviewed  Pertinent Labs Comments: Glu 113  Pertinent Medications Reviewed: reviewed  Pertinent Medications Comments: noted    Physical Findings/Assessment    Juwan score 20     Estimated/Assessed Needs    Weight Used For Calorie Calculations: 97.8 kg (215 lb 9.8 oz)  Energy Calorie Requirements (kcal): 1718 kcal daily(x 1.0 for obesity)  Energy Need Method: [place]-St Jeor  Protein Requirements: 78 gm daily  Weight Used For Protein Calculations: 97.8 kg (215 lb 9.8 oz)  Fluid Requirements (mL): 1 mL/kcal or per MD     RDA Method (mL): 1718  CHO Requirement: 215 gm daily    Nutrition Prescription Ordered    Current Diet Order: Regular    Evaluation of Received Nutrient/Fluid Intake    Comments: LBM 4/19  % Intake of Estimated Energy Needs: 75 - 100 %  % Meal Intake: 75 - 100 %    Nutrition Risk    Level of Risk/Frequency of Follow-up: low     Assessment and Plan    Nutrition Problem  Obesity    Related to (etiology):   excessive caloric intake    Signs and Symptoms (as evidenced by):   BMI of 37    Interventions:  Collaboration with other providers    Nutrition Diagnosis Status:   New      Monitor and Evaluation    Food and Nutrient Intake: energy intake  Food and Nutrient Adminstration: diet order  Physical Activity " and Function: nutrition-related ADLs and IADLs  Anthropometric Measurements: weight, weight change  Biochemical Data, Medical Tests and Procedures: inflammatory profile  Nutrition-Focused Physical Findings: other (specify)     Malnutrition Assessment    BMI 37    Nutrition Follow-Up    RD Follow-up?: Yes

## 2020-04-23 NOTE — PROGRESS NOTES
[facility] Hospital Medicine  Progress Note    Patient Name: Wayne Garces  MRN: [id number]  Patient Class: IP- Inpatient   Admission Date: 2020  Length of Stay: 7 days  Attending Physician: Melody Alba MD  Primary Care Provider: Primary Doctor No        Subjective:     Principal Problem:Acute deep vein thrombosis (DVT) of femoral vein of left lower extremity        HPI:  This is 24 y.o female with no medical history who presents to the hospital complaint of left leg pain for 4 days and progressively worsening for the last 2 days. Patient reports pain starting at her left thigh and radiating throughout the leg for the last 2 days. Associated symptoms are redness and swelling of left upper leg. Pain described as continuous aching 10/10 during the interview. She reports pain getting better if she raised her leg for the first 2 days but now nothing can help. She tried to take Tylenol at home but Tylenol doesn't help the leg pain just helps with her headache only. She finds she has descreased range of motion of the knee secondary to pain but denies any numbness in the leg. She reports she went to the hospital on 3/31/2020 because of fever, cough and body ache but sent home without Covid 19 testing. She has not been ambulating much at home secondary to body aches and fatigue. Denies any SOB, fever, chest pain, abdominal pain, n/v/d. No history of clotting disorder or DVT. Not on birth control pills. Denies smoking. Surgical history includes  section and tubal ligation per report.    In ED, US LLE vein shows extensive deep venous thrombosis involving the left common femoral vein, femoral vein, greater saphenous vein, popliteal vein, as well as anterior and posterior tibial veins and peroneal vein. EKG with sinus tachycardia 126 H/H 11.7/36.5; coagulation studies normal; Covid-19 positive.    History obtained via language line.    Overview/Hospital Course:  Ms Mcdonald  presented with left lower extremity swelling secondary to extensive deep vein thrombus extending from left iliac vein down to posterior tibial veins. ED consulted vascular surgery recommending pharmaco-mechanical thrombectomy and initiating heparin drip in the interim pending tentative procedure. Per discussion between ED physician and vascular surgeon was to transfer to [facility] [place] for procedure. Patient tested positive for COVID-19, which is potentially cause for acute thrombus. Seems that because of positive test, transfer was cancelled. IR consulted. Underwent 2 sessions of thrombectomy/thrombolysis. IR physician incidentally noted severe focal narrowing of the proximal left common iliac vein concerning for external venous compression. Underwent venoplasty without resolution of stenosis, therefore had stent placed. Stenosis successfully treated with this. Patient tolerated procedure very well and transferred back to ICU for close monitoring. Is on heparin infusion with plans to transition to NOAC. IR physician also recommends aspirin 81 mg daily indefinitely for stent. Patient is uninsured and has no SSN. Patient assistance program application submitted to assist with cost of NOAC which she needs for at least 3-6 months. Stepped down to floor 4/18,  Patient is stable on RA.  SW is consulted for Xarelto arrangement.  Afebrile.    Interval History: Feeling better.    Review of Systems   HENT: Negative for ear discharge and ear pain.    Eyes: Negative for discharge and itching.   Endocrine: Negative for cold intolerance and heat intolerance.   Neurological: Negative for seizures and syncope.     Objective:     Vital Signs (Most Recent):  Temp: 98.3 °F (36.8 °C) (04/23/20 0705)  Pulse: 81 (04/23/20 0705)  Resp: 18 (04/23/20 0705)  BP: (!) 125/58 (04/23/20 0705)  SpO2: 98 % (04/23/20 0705) Vital Signs (24h Range):  Temp:  [98.1 °F (36.7 °C)-101.6 °F (38.7 °C)] 98.3 °F (36.8 °C)  Pulse:  [] 81  Resp:  [18-20]  18  SpO2:  [93 %-100 %] 98 %  BP: (113-137)/(53-77) 125/58     Weight: 97.8 kg (215 lb 9.8 oz)  Body mass index is 37.01 kg/m².    Intake/Output Summary (Last 24 hours) at 4/23/2020 0918  Last data filed at 4/22/2020 1713  Gross per 24 hour   Intake 480 ml   Output 420 ml   Net 60 ml      Physical Exam   Constitutional: She is oriented to person, place, and time. She appears well-developed. No distress.   Cardiovascular: Normal rate and intact distal pulses.   Pulmonary/Chest: Effort normal and breath sounds normal.   Abdominal: Soft.   Neurological: She is alert and oriented to person, place, and time.   Skin: She is not diaphoretic.   Psychiatric: She has a normal mood and affect. Her behavior is normal. Judgment and thought content normal.   Nursing note and vitals reviewed.      Significant Labs:   BMP:   No results for input(s): GLU, NA, K, CL, CO2, BUN, CREATININE, CALCIUM, MG in the last 48 hours.  CBC:   Recent Labs   Lab 04/22/20  0540 04/23/20  0519   WBC 6.53 7.34   HGB 7.6* 7.4*   HCT 23.9* 23.8*    227       Significant Imaging: I have reviewed all pertinent imaging results/findings within the past 24 hours.      Assessment/Plan:      * Acute deep vein thrombosis (DVT) of femoral vein of left lower extremity  Large thrombus likely related to COVID-19 infection  IR consulted. Underwent two sessions of thrombectomy/thrombolysis and stent placement in left internal iliac vein for severe stenosis not resolved with venoplasty. This was successful  Working with  to help patient with cost of NOAC she will need for at least 3 months  Is uninsured and does not have a SSN. I discussed with   Stop dilaudid PCA and started oral narcotic pain regimen with breakthrough IV morphine if needed.  Stop Heparin gtt and start Xarelto.  SW consulted for NOAC options.  PT evaluation.    Acute deep vein thrombosis (DVT) of iliac vein of left lower extremity  As above      Acute deep vein  thrombosis (DVT) of distal vein of left lower extremity  See above    Obesity (BMI 35.0-39.9 without comorbidity)  Recommend diet and lifestyle modifications outpatient    COVID-19 virus infection  Covid positive. Possibly contributory to DVT given risk for hyper coaguable state.   Respiratory status very stable,on RA.  Afebrile.      VTE Risk Mitigation (From admission, onward)         Ordered     rivaroxaban tablet 15 mg  2 times daily with meals      04/20/20 0855     IP VTE HIGH RISK PATIENT  Once      04/16/20 1951                      Melody Alba MD  Department of Hospital Medicine   [facility]

## 2020-04-23 NOTE — SUBJECTIVE & OBJECTIVE
Interval History: Feeling better.    Review of Systems   HENT: Negative for ear discharge and ear pain.    Eyes: Negative for discharge and itching.   Endocrine: Negative for cold intolerance and heat intolerance.   Neurological: Negative for seizures and syncope.     Objective:     Vital Signs (Most Recent):  Temp: 98.3 °F (36.8 °C) (04/23/20 0705)  Pulse: 81 (04/23/20 0705)  Resp: 18 (04/23/20 0705)  BP: (!) 125/58 (04/23/20 0705)  SpO2: 98 % (04/23/20 0705) Vital Signs (24h Range):  Temp:  [98.1 °F (36.7 °C)-101.6 °F (38.7 °C)] 98.3 °F (36.8 °C)  Pulse:  [] 81  Resp:  [18-20] 18  SpO2:  [93 %-100 %] 98 %  BP: (113-137)/(53-77) 125/58     Weight: 97.8 kg (215 lb 9.8 oz)  Body mass index is 37.01 kg/m².    Intake/Output Summary (Last 24 hours) at 4/23/2020 0918  Last data filed at 4/22/2020 1713  Gross per 24 hour   Intake 480 ml   Output 420 ml   Net 60 ml      Physical Exam   Constitutional: She is oriented to person, place, and time. She appears well-developed. No distress.   Cardiovascular: Normal rate and intact distal pulses.   Pulmonary/Chest: Effort normal and breath sounds normal.   Abdominal: Soft.   Neurological: She is alert and oriented to person, place, and time.   Skin: She is not diaphoretic.   Psychiatric: She has a normal mood and affect. Her behavior is normal. Judgment and thought content normal.   Nursing note and vitals reviewed.      Significant Labs:   BMP:   No results for input(s): GLU, NA, K, CL, CO2, BUN, CREATININE, CALCIUM, MG in the last 48 hours.  CBC:   Recent Labs   Lab 04/22/20  0540 04/23/20  0519   WBC 6.53 7.34   HGB 7.6* 7.4*   HCT 23.9* 23.8*    227       Significant Imaging: I have reviewed all pertinent imaging results/findings within the past 24 hours.

## 2020-04-23 NOTE — PLAN OF CARE
Problem: Occupational Therapy Goal  Goal: Occupational Therapy Goal  Description  Goals to be met by: 05/06/20     Patient will increase functional independence with ADLs by performing:    LE Dressing with Supervision.  Grooming while standing at sink with Supervision.  Toileting from toilet with Supervision for hygiene and clothing management.   Step transfer with Supervision  Toilet transfer to toilet with Supervision.     Outcome: Ongoing, Progressing  Pt self-limiting with LLE WB. Pt non-WB to LLE with RW from bed>chair d/t pain; pt able to WB while standing at the sink for grooming then WB from sink>bed with prolonged time required d/t pain.

## 2020-04-23 NOTE — PT/OT/SLP PROGRESS
Physical Therapy Treatment    Patient Name:  Wayne Garces   MRN:  [id number]    Recommendations:     Discharge Recommendations:  home   Discharge Equipment Recommendations: (TBD; most likely RW)   Barriers to discharge: None    Assessment:     Wayne Garces is a 25 y.o. female admitted with a medical diagnosis of Acute deep vein thrombosis (DVT) of femoral vein of left lower extremity.  She presents with the following impairments/functional limitations:  weakness, impaired endurance, gait instability, decreased lower extremity function, impaired functional mobilty, impaired self care skills, impaired balance, decreased coordination, decreased safety awareness, decreased ROM, edema, pain, decreased upper extremity function.  Pt with improved ambulation and WB to LLE.  Requires encouragement for increasing weight bearing to involved LE.  Pt ambulated ~20ft with RW, CGA-SBA.  Pt would continue to benefit from skilled PT services to address pt's deficits and improve current level of function.    Rehab Prognosis: Good; patient would benefit from acute skilled PT services to address these deficits and reach maximum level of function.    Recent Surgery: Procedure(s) (LRB):  THROMBOLYSIS OR THROMBECTOMY, BLOOD VESSEL, LOWER EXTREMITY (Left) 6 Days Post-Op    Plan:     During this hospitalization, patient to be seen 5 x/week to address the identified rehab impairments via gait training, therapeutic activities, therapeutic exercises and progress toward the following goals:    · Plan of Care Expires:  05/05/20    Subjective     Chief Complaint: painful  Patient/Family Comments/goals: Spoke with language line upon entering pt's room.  Pt reports she is doing okay and she will try to walk.  Pain/Comfort:  · Pain Rating 1: 7/10  · Location - Side 1: Left  · Location 1: leg  · Pain Addressed 1: Pre-medicate for activity, Reposition, Distraction, Nurse notified      Objective:     Communicated with pt's  nurse, Marilu, prior to session.  Patient found in [place] with LLE elevated with peripheral IV upon PT entry to room.     General Precautions: Standard, fall   Orthopedic Precautions:N/A   Braces: N/A     Functional Mobility:  · Transfers: x2 from chair   · Sit to Stand:  stand by assistance with rolling walker  · Gait: Pt ambulated ~20ft with RW, CGA-SBA with 3 point gait pattern and WBAT. Requires cues for sequencing, RW managment, and weight distribution.  Max decreased eric and step length.  Decreased velocity of limb, heel strike, foot clearance, postural control, endurance and safety.  · Balance: good sitting and fair standing      AM-PAC 6 CLICK MOBILITY  Turning over in bed (including adjusting bedclothes, sheets and blankets)?: 4  Sitting down on and standing up from a chair with arms (e.g., wheelchair, bedside commode, etc.): 4  Moving from lying on back to sitting on the side of the bed?: 4  Moving to and from a bed to a chair (including a wheelchair)?: 3  Need to walk in hospital room?: 3  Climbing 3-5 steps with a railing?: 2  Basic Mobility Total Score: 20       Therapeutic Activities and Exercises:   Requires max encouragement to increase WB to LLE.    Standing lateral weight shifts with RW x10   Chair pushups x10 reps (encouraged to increase weight to LLE, pt leaning to R side)  Seated therex to BLEs x15 reps AROM: Marching, LAQs, hip abduction/adduction, SLRs, heel raises/toe raises, heel slides  Improved knee flexion today, however, continues to lack a great deal of knee flexion 2/2 pain.  Pt up in chair with LLE elevated and all needs met, encouraged to use call button for all OOchair activity.    Patient left up in chair with LLE elevated with all lines intact, call button in reach and pt's nurse, Marilu, notified..    GOALS:   Multidisciplinary Problems     Physical Therapy Goals        Problem: Physical Therapy Goal    Goal Priority Disciplines Outcome Goal Variances Interventions    Physical Therapy Goal     PT, PT/OT Ongoing, Progressing     Description:  Goals to be met by: 20     Patient will increase functional independence with mobility by performin. Supine to sit with Modified [place]  2. Rolling to Left and Right with Modified [place]  3. Sit to stand transfer with Modified [place]  4. Bed to chair transfer with Modified [place]   5. Gait >50 feet with Modified [place] with or without appropriate AD  6. Lower extremity exercise program 3 sets x10 reps per handout, with independence                      Time Tracking:     PT Received On: 20  PT Start Time: 1432     PT Stop Time: 1457  PT Total Time (min): 25 min     Billable Minutes: Gait Training 13 and Therapeutic Exercise 12    Treatment Type: Treatment  PT/PTA: PTA     PTA Visit Number: 2     Ariela Topher, PTA  2020

## 2020-04-23 NOTE — PLAN OF CARE
Problem: Physical Therapy Goal  Goal: Physical Therapy Goal  Description  Goals to be met by: 20     Patient will increase functional independence with mobility by performin. Supine to sit with Modified [place]  2. Rolling to Left and Right with Modified [place]  3. Sit to stand transfer with Modified [place]  4. Bed to chair transfer with Modified [place]   5. Gait >50 feet with Modified [place] with or without appropriate AD  6. Lower extremity exercise program 3 sets x10 reps per handout, with independence     Outcome: Ongoing, Progressing    Pt with improved ambulation and WB to LLE.  Requires encouragement for increasing weight bearing to involved LE.  Pt ambulated ~20ft with RW, CGA-SBA.  Pt would continue to benefit from skilled PT services to address pt's deficits and improve current level of function.

## 2020-04-23 NOTE — NURSING
Pt aaox4. Pt free from falls or injuries. PRN pain medication given once. Call light in reach. Will continue to monitor.    normal...

## 2020-04-23 NOTE — PLAN OF CARE
Recommendations    1. Continue current Reguar diet.  2. Encourage good po intake.   Goals: Pt to meet >/= 75% of EEN/EPN.  Nutrition Goal Status: new

## 2020-04-23 NOTE — NURSING
Report received from Sneha. Patient remains free from falls and injury. No distress noted. VSS. Questions encouraged and answered via . Patient verbalized understanding. Bed locked and in low position; safety maintained. Call light in reach. White board updated, and explained. No complaint of pain, n/v, diarrhea, or SOB.  Will continue to monitor, will continue with plan of care.

## 2020-04-24 LAB
BASOPHILS # BLD AUTO: 0.03 K/UL (ref 0–0.2)
BASOPHILS NFR BLD: 0.4 % (ref 0–1.9)
CRP SERPL-MCNC: 149.3 MG/L (ref 0–8.2)
DIFFERENTIAL METHOD: ABNORMAL
EOSINOPHIL # BLD AUTO: 0.3 K/UL (ref 0–0.5)
EOSINOPHIL NFR BLD: 3.8 % (ref 0–8)
ERYTHROCYTE [DISTWIDTH] IN BLOOD BY AUTOMATED COUNT: 13.7 % (ref 11.5–14.5)
HCT VFR BLD AUTO: 23.5 % (ref 37–48.5)
HGB BLD-MCNC: 7.2 G/DL (ref 12–16)
IMM GRANULOCYTES # BLD AUTO: 0.19 K/UL (ref 0–0.04)
IMM GRANULOCYTES NFR BLD AUTO: 2.6 % (ref 0–0.5)
LYMPHOCYTES # BLD AUTO: 1.5 K/UL (ref 1–4.8)
LYMPHOCYTES NFR BLD: 21 % (ref 18–48)
MCH RBC QN AUTO: 27.3 PG (ref 27–31)
MCHC RBC AUTO-ENTMCNC: 30.6 G/DL (ref 32–36)
MCV RBC AUTO: 89 FL (ref 82–98)
MONOCYTES # BLD AUTO: 0.5 K/UL (ref 0.3–1)
MONOCYTES NFR BLD: 7.2 % (ref 4–15)
NEUTROPHILS # BLD AUTO: 4.8 K/UL (ref 1.8–7.7)
NEUTROPHILS NFR BLD: 65 % (ref 38–73)
NRBC BLD-RTO: 0 /100 WBC
PLATELET # BLD AUTO: 273 K/UL (ref 150–350)
PMV BLD AUTO: 11.8 FL (ref 9.2–12.9)
RBC # BLD AUTO: 2.64 M/UL (ref 4–5.4)
WBC # BLD AUTO: 7.35 K/UL (ref 3.9–12.7)

## 2020-04-24 PROCEDURE — 94761 N-INVAS EAR/PLS OXIMETRY MLT: CPT

## 2020-04-24 PROCEDURE — 97535 SELF CARE MNGMENT TRAINING: CPT

## 2020-04-24 PROCEDURE — 86140 C-REACTIVE PROTEIN: CPT

## 2020-04-24 PROCEDURE — 97530 THERAPEUTIC ACTIVITIES: CPT

## 2020-04-24 PROCEDURE — 25000003 PHARM REV CODE 250: Performed by: INTERNAL MEDICINE

## 2020-04-24 PROCEDURE — 97116 GAIT TRAINING THERAPY: CPT | Mod: CQ

## 2020-04-24 PROCEDURE — 36415 COLL VENOUS BLD VENIPUNCTURE: CPT

## 2020-04-24 PROCEDURE — 25000003 PHARM REV CODE 250: Performed by: HOSPITALIST

## 2020-04-24 PROCEDURE — 97110 THERAPEUTIC EXERCISES: CPT | Mod: CQ

## 2020-04-24 PROCEDURE — 11000001 HC ACUTE MED/SURG PRIVATE ROOM

## 2020-04-24 PROCEDURE — 85025 COMPLETE CBC W/AUTO DIFF WBC: CPT

## 2020-04-24 RX ADMIN — OXYCODONE HYDROCHLORIDE AND ACETAMINOPHEN 1 TABLET: 10; 325 TABLET ORAL at 06:04

## 2020-04-24 RX ADMIN — RIVAROXABAN 15 MG: 15 TABLET, FILM COATED ORAL at 09:04

## 2020-04-24 RX ADMIN — RIVAROXABAN 15 MG: 15 TABLET, FILM COATED ORAL at 06:04

## 2020-04-24 RX ADMIN — ASPIRIN 81 MG 81 MG: 81 TABLET ORAL at 09:04

## 2020-04-24 NOTE — PT/OT/SLP PROGRESS
Occupational Therapy   Treatment    Name: Wayne Garces  MRN: [id number]  Admitting Diagnosis:  Acute deep vein thrombosis (DVT) of femoral vein of left lower extremity  7 Days Post-Op    Recommendations:     Discharge Recommendations: home  Discharge Equipment Recommendations:  walker, rolling  Barriers to discharge:  None    Assessment:     Wayne Garces is a 25 y.o. female with a medical diagnosis of Acute deep vein thrombosis (DVT) of femoral vein of left lower extremity. Performance deficits affecting function are weakness, impaired self care skills, impaired balance, decreased ROM, impaired skin, impaired functional mobilty, decreased lower extremity function, edema, pain, gait instability. Pt still resistive with LLE WB, but making progress towards goals.     Rehab Prognosis:  Good; patient would benefit from acute skilled OT services to address these deficits and reach maximum level of function.       Plan:     Patient to be seen (3-5x/week) to address the above listed problems via self-care/home management, therapeutic exercises, therapeutic activities  · Plan of Care Expires: 05/06/20  · Plan of Care Reviewed with: patient    Subjective     Chief complaint: wanted her linens and changed and clean gown d/t sweaty overnight   Patient's comments/goals: needed to use the restroom; participatory with session     Pain/Comfort:  · Pain Rating 1: (10/10 initially with WB to LLE)  · Pain Addressed 1: Reposition, Distraction, Pre-medicate for activity    Objective:     Patient found HOB elevated with peripheral IV upon OT entry to room.    General Precautions: Standard, fall   Orthopedic Precautions:N/A   Braces: N/A     Occupational Performance:     Bed Mobility:    · Patient completed Scooting/Bridging with supervision  · Patient completed Supine to Sit with supervision     Functional Mobility/Transfers:  · Patient completed Sit <> Stand Transfer with stand by assistance  with  rolling  walker   · Patient completed Toilet Transfer Step Transfer technique with supervision with  rolling walker  · Functional Mobility: Pt initially walked from the bed>toilet with RW treating LLE NWB with SBA d/t pain despite encouragement to bear weight. Pt then was able to WBAT to LLE with pericare standing and ambulated from toilet to sink for standing ADLs with LLE WBAT using RW and SBA/SUP.     Activities of Daily Living:  · Grooming: stand by assistance standing at the sink to brush her teeth and wash her hands; pt required cueing d/t LLE extended anteriorly in order to correct YAS for increase WB to LLE  · Bathing: stand by assistance for dynamic reaching for pericare wash off while standing without RW. LLE was extended with PWB   · Upper Body Dressing: set-up with changing out of dirty gown     · Toileting: supervision        [facility] 6 Click ADL: 22    Treatment & Education:  · Pt re-educated on OT role/POC.   · Safety during functional t/f and mobility with LLE WB  · Pt completed dynamic reaching activity to L side with RUE x20 ADL objects in order to increase weightbearing to LLE; pt tolerated this well with encouragement. Pt required cueing to correct YAS d/t LLE extended anteriorly with PWB   · Multiple self-care tasks/functional mobility completed- assistance level noted above   · All questions/concerns answered within OT scope of practice       Patient left up in chair with LLE elevated with all lines intact, call button in reach and nurse, Sil, and PCT notifiedEducation:  . Sil notified that urine was in the collection cap in the commode and discolored.     GOALS:   Multidisciplinary Problems     Occupational Therapy Goals        Problem: Occupational Therapy Goal    Goal Priority Disciplines Outcome Interventions   Occupational Therapy Goal     OT, PT/OT Ongoing, Progressing    Description:  Goals to be met by: 05/06/20     Patient will increase functional independence with ADLs by performing:    LE  Dressing with Supervision.  Grooming while standing at sink with Supervision.  Toileting from toilet with Supervision for hygiene and clothing management.   Step transfer with Supervision  Toilet transfer to toilet with Supervision.                      Time Tracking:     OT Date of Treatment: 04/24/20  OT Start Time: 1046  OT Stop Time: 1115  OT Total Time (min): 29 min    Billable Minutes:Self Care/Home Management 15 min  Therapeutic Activity 14 min  Total Time 29 min    Evette Hogue, OT  4/24/2020

## 2020-04-24 NOTE — NURSING
Report received from Sneha. Patient remains free from falls and injury. No distress noted. VSS. Questions encouraged and answered. Patient verbalized understanding. Bed locked and in low position; safety maintained. Call light in reach. White board updated, and explained. No complaint of pain, n/v, diarrhea, or SOB.  Will continue to monitor, will continue with plan of care.

## 2020-04-24 NOTE — PT/OT/SLP PROGRESS
Physical Therapy Treatment    Patient Name:  Wayne Garces   MRN:  [id number]    Recommendations:     Discharge Recommendations:  home   Discharge Equipment Recommendations: walker, rolling   Barriers to discharge: None    Assessment:     Wayne Garces is a 25 y.o. female admitted with a medical diagnosis of Acute deep vein thrombosis (DVT) of femoral vein of left lower extremity.  She presents with the following impairments/functional limitations:  weakness, impaired endurance, impaired self care skills, impaired balance, decreased coordination, decreased safety awareness, pain, edema, decreased lower extremity function, gait instability, decreased ROM, impaired functional mobilty, impaired skin.  Pt ambulated ~24ft with RW, SBA with encouragement for heel toe WB.  Pt ambulates PWB due to increased pain and encouraged to increase WB to involved LE.  Received handouts for therex. Please encourage ankle pumps with LLE elevated, OOB activity, and increased WB to LLE as tolerated. Pt would continue to benefit from skilled PT services to address pt's deficits and improve current level of function.    Rehab Prognosis: Fair; patient would benefit from acute skilled PT services to address these deficits and reach maximum level of function.    Recent Surgery: Procedure(s) (LRB):  THROMBOLYSIS OR THROMBECTOMY, BLOOD VESSEL, LOWER EXTREMITY (Left) 7 Days Post-Op    Plan:     During this hospitalization, patient to be seen 5 x/week to address the identified rehab impairments via gait training, therapeutic activities, therapeutic exercises and progress toward the following goals:    · Plan of Care Expires:  05/05/20    Subjective     Chief Complaint: pain to LLE  Patient/Family Comments/goals: Spoke with pt via language line and HEP handouts for exercises.  Pain/Comfort:  · Pain Rating 1: 7/10  · Location - Side 1: Left  · Location 1: leg  · Pain Addressed 1: Reposition, Distraction, Nurse notified,  Cessation of Activity      Objective:     Communicated with pt's nurse, Marilu, prior to session.  Patient found HOB elevated with peripheral IV upon PT entry to room.     General Precautions: Standard, fall   Orthopedic Precautions:N/A   Braces: N/A     Functional Mobility:  · Bed Mobility:     · Scooting: supervision  · Supine to Sit: supervision  · Sit to Supine: supervision  · Transfers: x2 from EOB     · Sit to Stand:  stand by assistance with rolling walker  · Gait: Pt ambulated ~24ft with RW, SBA 3point gait pattern with PWB.  Encouraged to increase WB and promote heel>toe.  Pt with quick step to RLE to offload L.  Decreased eric, step length, velocity of limb, postural control, endurance, foot clearance, and weight shifting.  · Balance: good sitting and fair standing.      AM-PAC 6 CLICK MOBILITY  Turning over in bed (including adjusting bedclothes, sheets and blankets)?: 4  Sitting down on and standing up from a chair with arms (e.g., wheelchair, bedside commode, etc.): 4  Moving from lying on back to sitting on the side of the bed?: 4  Moving to and from a bed to a chair (including a wheelchair)?: 3  Need to walk in hospital room?: 3  Climbing 3-5 steps with a railing?: 2  Basic Mobility Total Score: 20       Therapeutic Activities and Exercises:    Requires max encouragement to increase WB to LLE.    Standing lateral/Anterior-Posterior weight shifts with RW x15 each way   Seated therex to BLEs x15 reps AROM: Marching, LAQs, hip abduction/adduction, SLRs, heel raises/toe raises, heel slides  Improved knee flexion today, however, continues to lack a great deal of knee flexion 2/2 pain. ~70* L knee flexion  Standing there x15 reps to LLE: Marching, knee flexion, hip abduction, towel crunches  Standing shoulder flexion to BUEs x15 reps   Received exercise handout in [place] with verbalization of understanding.  Communicated with pt through language line when needed.    Patient left HOB elevated with LLE  elevated with all lines intact, call button in reach and pt's nurse, Marilu notified..    GOALS:   Multidisciplinary Problems     Physical Therapy Goals        Problem: Physical Therapy Goal    Goal Priority Disciplines Outcome Goal Variances Interventions   Physical Therapy Goal     PT, PT/OT Ongoing, Progressing     Description:  Goals to be met by: 20     Patient will increase functional independence with mobility by performin. Supine to sit with Modified [place]  2. Rolling to Left and Right with Modified [place]  3. Sit to stand transfer with Modified [place]  4. Bed to chair transfer with Modified [place]   5. Gait >50 feet with Modified [place] with or without appropriate AD  6. Lower extremity exercise program 3 sets x10 reps per handout, with independence                      Time Tracking:     PT Received On: 20  PT Start Time: 1354     PT Stop Time: 1425  PT Total Time (min): 31 min     Billable Minutes: Gait Training 12 and Therapeutic Exercise 19    Treatment Type: Treatment  PT/PTA: PTA     PTA Visit Number: 3     Ariela Ancar, PTA  2020

## 2020-04-24 NOTE — SUBJECTIVE & OBJECTIVE
Interval History: Feeling better.    Review of Systems   HENT: Negative for ear discharge and ear pain.    Eyes: Negative for discharge and itching.   Endocrine: Negative for cold intolerance and heat intolerance.   Neurological: Negative for seizures and syncope.     Objective:     Vital Signs (Most Recent):  Temp: 98.3 °F (36.8 °C) (04/24/20 0724)  Pulse: 81 (04/24/20 0724)  Resp: 20 (04/24/20 0724)  BP: (!) 108/56 (04/24/20 0724)  SpO2: 95 % (04/24/20 0724) Vital Signs (24h Range):  Temp:  [98.3 °F (36.8 °C)-99 °F (37.2 °C)] 98.3 °F (36.8 °C)  Pulse:  [] 81  Resp:  [18-20] 20  SpO2:  [95 %-99 %] 95 %  BP: (108-126)/(56-75) 108/56     Weight: 97.8 kg (215 lb 9.8 oz)  Body mass index is 37.01 kg/m².    Intake/Output Summary (Last 24 hours) at 4/24/2020 0752  Last data filed at 4/23/2020 1100  Gross per 24 hour   Intake 240 ml   Output --   Net 240 ml      Physical Exam   Constitutional: She is oriented to person, place, and time. She appears well-developed. No distress.   Cardiovascular: Normal rate and intact distal pulses.   Pulmonary/Chest: Effort normal and breath sounds normal.   Abdominal: Soft.   Neurological: She is alert and oriented to person, place, and time.   Skin: She is not diaphoretic.   Psychiatric: She has a normal mood and affect. Her behavior is normal. Judgment and thought content normal.   Nursing note and vitals reviewed.      Significant Labs:   BMP:   No results for input(s): GLU, NA, K, CL, CO2, BUN, CREATININE, CALCIUM, MG in the last 48 hours.  CBC:   Recent Labs   Lab 04/23/20  0519 04/24/20  0406   WBC 7.34 7.35   HGB 7.4* 7.2*   HCT 23.8* 23.5*    273       Significant Imaging: I have reviewed all pertinent imaging results/findings within the past 24 hours.

## 2020-04-24 NOTE — PROGRESS NOTES
[facility] Hospital Medicine  Progress Note    Patient Name: Wayne Garces  MRN: [id number]  Patient Class: IP- Inpatient   Admission Date: 2020  Length of Stay: 8 days  Attending Physician: Melody Alba MD  Primary Care Provider: Primary Doctor No        Subjective:     Principal Problem:Acute deep vein thrombosis (DVT) of femoral vein of left lower extremity        HPI:  This is 24 y.o female with no medical history who presents to the hospital complaint of left leg pain for 4 days and progressively worsening for the last 2 days. Patient reports pain starting at her left thigh and radiating throughout the leg for the last 2 days. Associated symptoms are redness and swelling of left upper leg. Pain described as continuous aching 10/10 during the interview. She reports pain getting better if she raised her leg for the first 2 days but now nothing can help. She tried to take Tylenol at home but Tylenol doesn't help the leg pain just helps with her headache only. She finds she has descreased range of motion of the knee secondary to pain but denies any numbness in the leg. She reports she went to the hospital on 3/31/2020 because of fever, cough and body ache but sent home without Covid 19 testing. She has not been ambulating much at home secondary to body aches and fatigue. Denies any SOB, fever, chest pain, abdominal pain, n/v/d. No history of clotting disorder or DVT. Not on birth control pills. Denies smoking. Surgical history includes  section and tubal ligation per report.    In ED, US LLE vein shows extensive deep venous thrombosis involving the left common femoral vein, femoral vein, greater saphenous vein, popliteal vein, as well as anterior and posterior tibial veins and peroneal vein. EKG with sinus tachycardia 126 H/H 11.7/36.5; coagulation studies normal; Covid-19 positive.    History obtained via language line.    Overview/Hospital Course:  Ms Mcdonald  presented with left lower extremity swelling secondary to extensive deep vein thrombus extending from left iliac vein down to posterior tibial veins. ED consulted vascular surgery recommending pharmaco-mechanical thrombectomy and initiating heparin drip in the interim pending tentative procedure. Per discussion between ED physician and vascular surgeon was to transfer to [facility] for procedure. Patient tested positive for COVID-19, which is potentially cause for acute thrombus. Seems that because of positive test, transfer was cancelled. IR consulted. Underwent 2 sessions of thrombectomy/thrombolysis. IR physician incidentally noted severe focal narrowing of the proximal left common iliac vein concerning for external venous compression. Underwent venoplasty without resolution of stenosis, therefore had stent placed. Stenosis successfully treated with this. Patient tolerated procedure very well and transferred back to ICU for close monitoring. Is on heparin infusion with plans to transition to NOAC. IR physician also recommends aspirin 81 mg daily indefinitely for stent. Patient is uninsured and has no SSN. Patient assistance program application submitted to assist with cost of NOAC which she needs for at least 3-6 months. Stepped down to floor 4/18,  Patient is stable on RA.  SW is consulted for Xarelto arrangement.  Afebrile.    Interval History: Feeling better.    Review of Systems   HENT: Negative for ear discharge and ear pain.    Eyes: Negative for discharge and itching.   Endocrine: Negative for cold intolerance and heat intolerance.   Neurological: Negative for seizures and syncope.     Objective:     Vital Signs (Most Recent):  Temp: 98.3 °F (36.8 °C) (04/24/20 0724)  Pulse: 81 (04/24/20 0724)  Resp: 20 (04/24/20 0724)  BP: (!) 108/56 (04/24/20 0724)  SpO2: 95 % (04/24/20 0724) Vital Signs (24h Range):  Temp:  [98.3 °F (36.8 °C)-99 °F (37.2 °C)] 98.3 °F (36.8 °C)  Pulse:  [] 81  Resp:  [18-20]  20  SpO2:  [95 %-99 %] 95 %  BP: (108-126)/(56-75) 108/56     Weight: 97.8 kg (215 lb 9.8 oz)  Body mass index is 37.01 kg/m².    Intake/Output Summary (Last 24 hours) at 4/24/2020 0752  Last data filed at 4/23/2020 1100  Gross per 24 hour   Intake 240 ml   Output --   Net 240 ml      Physical Exam   Constitutional: She is oriented to person, place, and time. She appears well-developed. No distress.   Cardiovascular: Normal rate and intact distal pulses.   Pulmonary/Chest: Effort normal and breath sounds normal.   Abdominal: Soft.   Neurological: She is alert and oriented to person, place, and time.   Skin: She is not diaphoretic.   Psychiatric: She has a normal mood and affect. Her behavior is normal. Judgment and thought content normal.   Nursing note and vitals reviewed.      Significant Labs:   BMP:   No results for input(s): GLU, NA, K, CL, CO2, BUN, CREATININE, CALCIUM, MG in the last 48 hours.  CBC:   Recent Labs   Lab 04/23/20  0519 04/24/20  0406   WBC 7.34 7.35   HGB 7.4* 7.2*   HCT 23.8* 23.5*    273       Significant Imaging: I have reviewed all pertinent imaging results/findings within the past 24 hours.      Assessment/Plan:      * Acute deep vein thrombosis (DVT) of femoral vein of left lower extremity  Large thrombus likely related to COVID-19 infection  IR consulted. Underwent two sessions of thrombectomy/thrombolysis and stent placement in left internal iliac vein for severe stenosis not resolved with venoplasty. This was successful  Working with  to help patient with cost of NOAC she will need for at least 3 months  Is uninsured and does not have a SSN. I discussed with   Stop dilaudid PCA and started oral narcotic pain regimen with breakthrough IV morphine if needed.  Stop Heparin gtt and start Xarelto.  SW consulted for NOAC options.  PT evaluation.    Acute deep vein thrombosis (DVT) of iliac vein of left lower extremity  As above      Acute deep vein  thrombosis (DVT) of distal vein of left lower extremity  See above    Obesity (BMI 35.0-39.9 without comorbidity)  Recommend diet and lifestyle modifications outpatient    COVID-19 virus infection  Covid positive. Possibly contributory to DVT given risk for hyper coaguable state.   Respiratory status very stable,on RA.  Afebrile.      VTE Risk Mitigation (From admission, onward)         Ordered     rivaroxaban tablet 15 mg  2 times daily with meals      04/20/20 0855     IP VTE HIGH RISK PATIENT  Once      04/16/20 1951                      Melody Alba MD  Department of Hospital Medicine   [facility]

## 2020-04-24 NOTE — NURSING
Pt aaox4. Pt on room air. Free from falls or injuries. Given PRN pain medication once for mild leg pain. Call light in reach. Will continue to monitor.

## 2020-04-24 NOTE — NURSING
Patient remains free from falls and injury. Safety maintained; bed lock and locked, call light in reach. No distress noted. VSS. No complaint of pain, n/v, diarrhea, or SOB. Questions encouraged and answered. Plan of care reviewed with patient. Will continue to monitor, will continue with plan of care.

## 2020-04-24 NOTE — PLAN OF CARE
Problem: Physical Therapy Goal  Goal: Physical Therapy Goal  Description  Goals to be met by: 20     Patient will increase functional independence with mobility by performin. Supine to sit with Modified [place]  2. Rolling to Left and Right with Modified [place]  3. Sit to stand transfer with Modified [place]  4. Bed to chair transfer with Modified [place]   5. Gait >50 feet with Modified [place] with or without appropriate AD  6. Lower extremity exercise program 3 sets x10 reps per handout, with independence     Outcome: Ongoing, Progressing   Pt ambulated ~24ft with RW, SBA with encouragement for heel toe WB.  Pt ambulates PWB due to increased pain and encouraged to increase WB to involved LE.  Received handouts for therex. Please encourage ankle pumps with LLE elevated, OOB activity, and increased WB to LLE as tolerated. Pt would continue to benefit from skilled PT services to address pt's deficits and improve current level of function.

## 2020-04-24 NOTE — PLAN OF CARE
04/24/20 1707   Discharge Reassessment   Assessment Type Discharge Planning Reassessment   Patient will DC home with Xarelto.  Patient is uninsured.  She has been given a card for $0 charge for one month.  Application for assistance remains in processing and can take up to 5-7 days per [employer] rep.  TN/SW to check to see if $0 card accepted once Rx escribed.

## 2020-04-24 NOTE — PLAN OF CARE
Problem: Occupational Therapy Goal  Goal: Occupational Therapy Goal  Description  Goals to be met by: 05/06/20     Patient will increase functional independence with ADLs by performing:    LE Dressing with Supervision.  Grooming while standing at sink with Supervision.  Toileting from toilet with Supervision for hygiene and clothing management.   Step transfer with Supervision  Toilet transfer to toilet with Supervision.     Outcome: Ongoing, Progressing    Pt still resistive with LLE WB, but making progress towards goals.

## 2020-04-25 LAB
BASOPHILS # BLD AUTO: 0.03 K/UL (ref 0–0.2)
BASOPHILS NFR BLD: 0.4 % (ref 0–1.9)
DIFFERENTIAL METHOD: ABNORMAL
EOSINOPHIL # BLD AUTO: 0.4 K/UL (ref 0–0.5)
EOSINOPHIL NFR BLD: 5.1 % (ref 0–8)
ERYTHROCYTE [DISTWIDTH] IN BLOOD BY AUTOMATED COUNT: 14 % (ref 11.5–14.5)
HCT VFR BLD AUTO: 24.2 % (ref 37–48.5)
HGB BLD-MCNC: 7.3 G/DL (ref 12–16)
IMM GRANULOCYTES # BLD AUTO: 0.23 K/UL (ref 0–0.04)
IMM GRANULOCYTES NFR BLD AUTO: 3.4 % (ref 0–0.5)
LYMPHOCYTES # BLD AUTO: 1.7 K/UL (ref 1–4.8)
LYMPHOCYTES NFR BLD: 24.6 % (ref 18–48)
MCH RBC QN AUTO: 26.8 PG (ref 27–31)
MCHC RBC AUTO-ENTMCNC: 30.2 G/DL (ref 32–36)
MCV RBC AUTO: 89 FL (ref 82–98)
MONOCYTES # BLD AUTO: 0.6 K/UL (ref 0.3–1)
MONOCYTES NFR BLD: 9.3 % (ref 4–15)
NEUTROPHILS # BLD AUTO: 3.9 K/UL (ref 1.8–7.7)
NEUTROPHILS NFR BLD: 57.2 % (ref 38–73)
NRBC BLD-RTO: 1 /100 WBC
PLATELET # BLD AUTO: 310 K/UL (ref 150–350)
PMV BLD AUTO: 11.4 FL (ref 9.2–12.9)
RBC # BLD AUTO: 2.72 M/UL (ref 4–5.4)
WBC # BLD AUTO: 6.86 K/UL (ref 3.9–12.7)

## 2020-04-25 PROCEDURE — 85025 COMPLETE CBC W/AUTO DIFF WBC: CPT

## 2020-04-25 PROCEDURE — 11000001 HC ACUTE MED/SURG PRIVATE ROOM

## 2020-04-25 PROCEDURE — 25000003 PHARM REV CODE 250: Performed by: HOSPITALIST

## 2020-04-25 PROCEDURE — 25000003 PHARM REV CODE 250: Performed by: INTERNAL MEDICINE

## 2020-04-25 PROCEDURE — 94761 N-INVAS EAR/PLS OXIMETRY MLT: CPT

## 2020-04-25 PROCEDURE — 36415 COLL VENOUS BLD VENIPUNCTURE: CPT

## 2020-04-25 RX ADMIN — ASPIRIN 81 MG 81 MG: 81 TABLET ORAL at 08:04

## 2020-04-25 RX ADMIN — OXYCODONE HYDROCHLORIDE AND ACETAMINOPHEN 1 TABLET: 10; 325 TABLET ORAL at 05:04

## 2020-04-25 RX ADMIN — RIVAROXABAN 15 MG: 15 TABLET, FILM COATED ORAL at 08:04

## 2020-04-25 RX ADMIN — RIVAROXABAN 15 MG: 15 TABLET, FILM COATED ORAL at 04:04

## 2020-04-25 NOTE — SUBJECTIVE & OBJECTIVE
Interval History: Feeling better.    Review of Systems   HENT: Negative for ear discharge and ear pain.    Eyes: Negative for discharge and itching.   Endocrine: Negative for cold intolerance and heat intolerance.   Neurological: Negative for seizures and syncope.     Objective:     Vital Signs (Most Recent):  Temp: 98 °F (36.7 °C) (04/25/20 0500)  Pulse: 85 (04/25/20 0500)  Resp: 19 (04/25/20 0500)  BP: 120/60 (04/25/20 0500)  SpO2: 99 % (04/25/20 0500) Vital Signs (24h Range):  Temp:  [98 °F (36.7 °C)-98.7 °F (37.1 °C)] 98 °F (36.7 °C)  Pulse:  [] 85  Resp:  [16-19] 19  SpO2:  [94 %-99 %] 99 %  BP: (117-123)/(57-67) 120/60     Weight: 97.8 kg (215 lb 9.8 oz)  Body mass index is 37.01 kg/m².    Intake/Output Summary (Last 24 hours) at 4/25/2020 0828  Last data filed at 4/25/2020 0600  Gross per 24 hour   Intake --   Output 300 ml   Net -300 ml      Physical Exam   Constitutional: She is oriented to person, place, and time. She appears well-developed. No distress.   Cardiovascular: Normal rate and intact distal pulses.   Pulmonary/Chest: Effort normal and breath sounds normal.   Abdominal: Soft.   Neurological: She is alert and oriented to person, place, and time.   Skin: She is not diaphoretic.   Psychiatric: She has a normal mood and affect. Her behavior is normal. Judgment and thought content normal.   Nursing note and vitals reviewed.      Significant Labs:   BMP:   No results for input(s): GLU, NA, K, CL, CO2, BUN, CREATININE, CALCIUM, MG in the last 48 hours.  CBC:   Recent Labs   Lab 04/24/20  0406 04/25/20  0357   WBC 7.35 6.86   HGB 7.2* 7.3*   HCT 23.5* 24.2*    310       Significant Imaging: I have reviewed all pertinent imaging results/findings within the past 24 hours.

## 2020-04-25 NOTE — PLAN OF CARE
Problem: Adult Inpatient Plan of Care  Goal: Plan of Care Review  Outcome: Ongoing, Progressing     Problem: Infection  Goal: Infection Symptom Resolution  Outcome: Ongoing, Progressing     Problem: Fall Injury Risk  Goal: Absence of Fall and Fall-Related Injury  Outcome: Ongoing, Progressing     Problem: Skin Injury Risk Increased  Goal: Skin Health and Integrity  Outcome: Ongoing, Progressing  Pt aaox4, able to use BSC with 1 person assist. Sat in recliner for about 3-4hrs. Has been on RA with sats w/I normal limits. Call light w/I reach.

## 2020-04-25 NOTE — PROGRESS NOTES
[facility] Hospital Medicine  Progress Note    Patient Name: Wayne Garces  MRN: [id number]  Patient Class: IP- Inpatient   Admission Date: 2020  Length of Stay: 9 days  Attending Physician: Melody Alba MD  Primary Care Provider: Primary Doctor No        Subjective:     Principal Problem:Acute deep vein thrombosis (DVT) of femoral vein of left lower extremity        HPI:  This is 24 y.o female with no medical history who presents to the hospital complaint of left leg pain for 4 days and progressively worsening for the last 2 days. Patient reports pain starting at her left thigh and radiating throughout the leg for the last 2 days. Associated symptoms are redness and swelling of left upper leg. Pain described as continuous aching 10/10 during the interview. She reports pain getting better if she raised her leg for the first 2 days but now nothing can help. She tried to take Tylenol at home but Tylenol doesn't help the leg pain just helps with her headache only. She finds she has descreased range of motion of the knee secondary to pain but denies any numbness in the leg. She reports she went to the hospital on 3/31/2020 because of fever, cough and body ache but sent home without Covid 19 testing. She has not been ambulating much at home secondary to body aches and fatigue. Denies any SOB, fever, chest pain, abdominal pain, n/v/d. No history of clotting disorder or DVT. Not on birth control pills. Denies smoking. Surgical history includes  section and tubal ligation per report.    In ED, US LLE vein shows extensive deep venous thrombosis involving the left common femoral vein, femoral vein, greater saphenous vein, popliteal vein, as well as anterior and posterior tibial veins and peroneal vein. EKG with sinus tachycardia 126 H/H 11.7/36.5; coagulation studies normal; Covid-19 positive.    History obtained via language line.    Overview/Hospital Course:  Ms Mcdonald  presented with left lower extremity swelling secondary to extensive deep vein thrombus extending from left iliac vein down to posterior tibial veins. ED consulted vascular surgery recommending pharmaco-mechanical thrombectomy and initiating heparin drip in the interim pending tentative procedure. Per discussion between ED physician and vascular surgeon was to transfer to [facility] for procedure. Patient tested positive for COVID-19, which is potentially cause for acute thrombus. Seems that because of positive test, transfer was cancelled. IR consulted. Underwent 2 sessions of thrombectomy/thrombolysis. IR physician incidentally noted severe focal narrowing of the proximal left common iliac vein concerning for external venous compression. Underwent venoplasty without resolution of stenosis, therefore had stent placed. Stenosis successfully treated with this. Patient tolerated procedure very well and transferred back to ICU for close monitoring. Is on heparin infusion with plans to transition to NOAC. IR physician also recommends aspirin 81 mg daily indefinitely for stent. Patient is uninsured and has no SSN. Patient assistance program application submitted to assist with cost of NOAC which she needs for at least 3-6 months. Stepped down to floor 4/18,  Patient is stable on RA.  SW is consulted for Xarelto arrangement.  Afebrile.    Interval History: Feeling better.    Review of Systems   HENT: Negative for ear discharge and ear pain.    Eyes: Negative for discharge and itching.   Endocrine: Negative for cold intolerance and heat intolerance.   Neurological: Negative for seizures and syncope.     Objective:     Vital Signs (Most Recent):  Temp: 98 °F (36.7 °C) (04/25/20 0500)  Pulse: 85 (04/25/20 0500)  Resp: 19 (04/25/20 0500)  BP: 120/60 (04/25/20 0500)  SpO2: 99 % (04/25/20 0500) Vital Signs (24h Range):  Temp:  [98 °F (36.7 °C)-98.7 °F (37.1 °C)] 98 °F (36.7 °C)  Pulse:  [] 85  Resp:  [16-19] 19  SpO2:   [94 %-99 %] 99 %  BP: (117-123)/(57-67) 120/60     Weight: 97.8 kg (215 lb 9.8 oz)  Body mass index is 37.01 kg/m².    Intake/Output Summary (Last 24 hours) at 4/25/2020 0828  Last data filed at 4/25/2020 0600  Gross per 24 hour   Intake --   Output 300 ml   Net -300 ml      Physical Exam   Constitutional: She is oriented to person, place, and time. She appears well-developed. No distress.   Cardiovascular: Normal rate and intact distal pulses.   Pulmonary/Chest: Effort normal and breath sounds normal.   Abdominal: Soft.   Neurological: She is alert and oriented to person, place, and time.   Skin: She is not diaphoretic.   Psychiatric: She has a normal mood and affect. Her behavior is normal. Judgment and thought content normal.   Nursing note and vitals reviewed.      Significant Labs:   BMP:   No results for input(s): GLU, NA, K, CL, CO2, BUN, CREATININE, CALCIUM, MG in the last 48 hours.  CBC:   Recent Labs   Lab 04/24/20  0406 04/25/20  0357   WBC 7.35 6.86   HGB 7.2* 7.3*   HCT 23.5* 24.2*    310       Significant Imaging: I have reviewed all pertinent imaging results/findings within the past 24 hours.      Assessment/Plan:      * Acute deep vein thrombosis (DVT) of femoral vein of left lower extremity  Large thrombus likely related to COVID-19 infection  IR consulted. Underwent two sessions of thrombectomy/thrombolysis and stent placement in left internal iliac vein for severe stenosis not resolved with venoplasty. This was successful  Working with  to help patient with cost of NOAC she will need for at least 3 months  Is uninsured and does not have a SSN. I discussed with   Stop dilaudid PCA and started oral narcotic pain regimen with breakthrough IV morphine if needed.  Stop Heparin gtt and start Xarelto.  SW consulted for NOAC options.  PT evaluation.    Acute deep vein thrombosis (DVT) of iliac vein of left lower extremity  As above      Acute deep vein thrombosis (DVT)  of distal vein of left lower extremity  See above    Obesity (BMI 35.0-39.9 without comorbidity)  Recommend diet and lifestyle modifications outpatient    COVID-19 virus infection  Covid positive. Possibly contributory to DVT given risk for hyper coaguable state.   Respiratory status very stable,on RA.  Afebrile.      VTE Risk Mitigation (From admission, onward)         Ordered     rivaroxaban tablet 15 mg  2 times daily with meals      04/20/20 0855     IP VTE HIGH RISK PATIENT  Once      04/16/20 1951                      Melody Alba MD  Department of Hospital Medicine   [facility]

## 2020-04-26 LAB
BASOPHILS # BLD AUTO: 0.03 K/UL (ref 0–0.2)
BASOPHILS NFR BLD: 0.5 % (ref 0–1.9)
CRP SERPL-MCNC: 62.8 MG/L (ref 0–8.2)
DIFFERENTIAL METHOD: ABNORMAL
EOSINOPHIL # BLD AUTO: 0.3 K/UL (ref 0–0.5)
EOSINOPHIL NFR BLD: 5.7 % (ref 0–8)
ERYTHROCYTE [DISTWIDTH] IN BLOOD BY AUTOMATED COUNT: 13.9 % (ref 11.5–14.5)
HCT VFR BLD AUTO: 23.7 % (ref 37–48.5)
HGB BLD-MCNC: 7.3 G/DL (ref 12–16)
IMM GRANULOCYTES # BLD AUTO: 0.22 K/UL (ref 0–0.04)
IMM GRANULOCYTES NFR BLD AUTO: 3.9 % (ref 0–0.5)
LYMPHOCYTES # BLD AUTO: 1.3 K/UL (ref 1–4.8)
LYMPHOCYTES NFR BLD: 22.7 % (ref 18–48)
MCH RBC QN AUTO: 26.7 PG (ref 27–31)
MCHC RBC AUTO-ENTMCNC: 30.8 G/DL (ref 32–36)
MCV RBC AUTO: 87 FL (ref 82–98)
MONOCYTES # BLD AUTO: 0.5 K/UL (ref 0.3–1)
MONOCYTES NFR BLD: 9.5 % (ref 4–15)
NEUTROPHILS # BLD AUTO: 3.2 K/UL (ref 1.8–7.7)
NEUTROPHILS NFR BLD: 57.7 % (ref 38–73)
NRBC BLD-RTO: 1 /100 WBC
PLATELET # BLD AUTO: 330 K/UL (ref 150–350)
PMV BLD AUTO: 11.5 FL (ref 9.2–12.9)
RBC # BLD AUTO: 2.73 M/UL (ref 4–5.4)
WBC # BLD AUTO: 5.59 K/UL (ref 3.9–12.7)

## 2020-04-26 PROCEDURE — 11000001 HC ACUTE MED/SURG PRIVATE ROOM

## 2020-04-26 PROCEDURE — 25000003 PHARM REV CODE 250: Performed by: INTERNAL MEDICINE

## 2020-04-26 PROCEDURE — 25000003 PHARM REV CODE 250: Performed by: HOSPITALIST

## 2020-04-26 PROCEDURE — 86140 C-REACTIVE PROTEIN: CPT

## 2020-04-26 PROCEDURE — 36415 COLL VENOUS BLD VENIPUNCTURE: CPT

## 2020-04-26 PROCEDURE — 97110 THERAPEUTIC EXERCISES: CPT | Mod: CQ

## 2020-04-26 PROCEDURE — 94761 N-INVAS EAR/PLS OXIMETRY MLT: CPT

## 2020-04-26 PROCEDURE — 97116 GAIT TRAINING THERAPY: CPT | Mod: CQ

## 2020-04-26 PROCEDURE — 85025 COMPLETE CBC W/AUTO DIFF WBC: CPT

## 2020-04-26 RX ADMIN — RIVAROXABAN 15 MG: 15 TABLET, FILM COATED ORAL at 04:04

## 2020-04-26 RX ADMIN — ASPIRIN 81 MG 81 MG: 81 TABLET ORAL at 09:04

## 2020-04-26 RX ADMIN — OXYCODONE HYDROCHLORIDE AND ACETAMINOPHEN 1 TABLET: 10; 325 TABLET ORAL at 04:04

## 2020-04-26 RX ADMIN — OXYCODONE HYDROCHLORIDE AND ACETAMINOPHEN 1 TABLET: 10; 325 TABLET ORAL at 12:04

## 2020-04-26 RX ADMIN — RIVAROXABAN 15 MG: 15 TABLET, FILM COATED ORAL at 09:04

## 2020-04-26 NOTE — PLAN OF CARE
Reviewed Plan of care with patient.  Little verbal exchanged b/c patient speaks little English.    Most of our communication was thru demostration and little English.  Safety measures in place.  No c/o falls or injuries this shift.  Will continue to monitor patient's care.

## 2020-04-26 NOTE — PROGRESS NOTES
[facility] Hospital Medicine  Progress Note    Patient Name: Wayne Garces  MRN: [id number]  Patient Class: IP- Inpatient   Admission Date: 2020  Length of Stay: 10 days  Attending Physician: Melody Alba MD  Primary Care Provider: Primary Doctor No        Subjective:     Principal Problem:Acute deep vein thrombosis (DVT) of femoral vein of left lower extremity        HPI:  This is 24 y.o female with no medical history who presents to the hospital complaint of left leg pain for 4 days and progressively worsening for the last 2 days. Patient reports pain starting at her left thigh and radiating throughout the leg for the last 2 days. Associated symptoms are redness and swelling of left upper leg. Pain described as continuous aching 10/10 during the interview. She reports pain getting better if she raised her leg for the first 2 days but now nothing can help. She tried to take Tylenol at home but Tylenol doesn't help the leg pain just helps with her headache only. She finds she has descreased range of motion of the knee secondary to pain but denies any numbness in the leg. She reports she went to the hospital on 3/31/2020 because of fever, cough and body ache but sent home without Covid 19 testing. She has not been ambulating much at home secondary to body aches and fatigue. Denies any SOB, fever, chest pain, abdominal pain, n/v/d. No history of clotting disorder or DVT. Not on birth control pills. Denies smoking. Surgical history includes  section and tubal ligation per report.    In ED, US LLE vein shows extensive deep venous thrombosis involving the left common femoral vein, femoral vein, greater saphenous vein, popliteal vein, as well as anterior and posterior tibial veins and peroneal vein. EKG with sinus tachycardia 126 H/H 11.7/36.5; coagulation studies normal; Covid-19 positive.    History obtained via language line.    Overview/Hospital Course:  Ms Mcdonald  presented with left lower extremity swelling secondary to extensive deep vein thrombus extending from left iliac vein down to posterior tibial veins. ED consulted vascular surgery recommending pharmaco-mechanical thrombectomy and initiating heparin drip in the interim pending tentative procedure. Per discussion between ED physician and vascular surgeon was to transfer to [facility] [place] for procedure. Patient tested positive for COVID-19, which is potentially cause for acute thrombus. Seems that because of positive test, transfer was cancelled. IR consulted. Underwent 2 sessions of thrombectomy/thrombolysis. IR physician incidentally noted severe focal narrowing of the proximal left common iliac vein concerning for external venous compression. Underwent venoplasty without resolution of stenosis, therefore had stent placed. Stenosis successfully treated with this. Patient tolerated procedure very well and transferred back to ICU for close monitoring. Is on heparin infusion with plans to transition to NOAC. IR physician also recommends aspirin 81 mg daily indefinitely for stent. Patient is uninsured and has no SSN. Patient assistance program application submitted to assist with cost of NOAC which she needs for at least 3-6 months. Stepped down to floor 4/18,  Patient is stable on RA.  SW is consulted for Xarelto arrangement.  Afebrile.    Interval History: Feeling better.    Review of Systems   HENT: Negative for ear discharge and ear pain.    Eyes: Negative for discharge and itching.   Endocrine: Negative for cold intolerance and heat intolerance.   Neurological: Negative for seizures and syncope.     Objective:     Vital Signs (Most Recent):  Temp: 98.6 °F (37 °C) (04/26/20 0700)  Pulse: 89 (04/26/20 0700)  Resp: 20 (04/26/20 0700)  BP: (!) 111/59 (04/26/20 0700)  SpO2: 98 % (04/26/20 0700) Vital Signs (24h Range):  Temp:  [96.6 °F (35.9 °C)-98.7 °F (37.1 °C)] 98.6 °F (37 °C)  Pulse:  [80-94] 89  Resp:  [18-20]  20  SpO2:  [98 %-100 %] 98 %  BP: (111-123)/(56-71) 111/59     Weight: 97.8 kg (215 lb 9.8 oz)  Body mass index is 37.01 kg/m².    Intake/Output Summary (Last 24 hours) at 4/26/2020 0946  Last data filed at 4/25/2020 1700  Gross per 24 hour   Intake 240 ml   Output 825 ml   Net -585 ml      Physical Exam   Constitutional: She is oriented to person, place, and time. She appears well-developed. No distress.   Cardiovascular: Normal rate and intact distal pulses.   Pulmonary/Chest: Effort normal and breath sounds normal.   Abdominal: Soft.   Neurological: She is alert and oriented to person, place, and time.   Skin: She is not diaphoretic.   Psychiatric: She has a normal mood and affect. Her behavior is normal. Judgment and thought content normal.   Nursing note and vitals reviewed.      Significant Labs:   BMP:   No results for input(s): GLU, NA, K, CL, CO2, BUN, CREATININE, CALCIUM, MG in the last 48 hours.  CBC:   Recent Labs   Lab 04/25/20  0357 04/26/20  0528   WBC 6.86 5.59   HGB 7.3* 7.3*   HCT 24.2* 23.7*    330       Significant Imaging: I have reviewed all pertinent imaging results/findings within the past 24 hours.      Assessment/Plan:      * Acute deep vein thrombosis (DVT) of femoral vein of left lower extremity  Large thrombus likely related to COVID-19 infection  IR consulted. Underwent two sessions of thrombectomy/thrombolysis and stent placement in left internal iliac vein for severe stenosis not resolved with venoplasty. This was successful  Working with  to help patient with cost of NOAC she will need for at least 3 months  Is uninsured and does not have a SSN. I discussed with   Stop dilaudid PCA and started oral narcotic pain regimen with breakthrough IV morphine if needed.  Stop Heparin gtt and start Xarelto.  SW consulted for NOAC options.  PT evaluation.    Acute deep vein thrombosis (DVT) of iliac vein of left lower extremity  As above      Acute deep vein  thrombosis (DVT) of distal vein of left lower extremity  See above    Obesity (BMI 35.0-39.9 without comorbidity)  Recommend diet and lifestyle modifications outpatient    COVID-19 virus infection  Covid positive. Possibly contributory to DVT given risk for hyper coaguable state.   Respiratory status very stable,on RA.  Afebrile.      VTE Risk Mitigation (From admission, onward)         Ordered     rivaroxaban tablet 15 mg  2 times daily with meals      04/20/20 0855     IP VTE HIGH RISK PATIENT  Once      04/16/20 1951                      Melody Alba MD  Department of Hospital Medicine   [facility]

## 2020-04-26 NOTE — PLAN OF CARE
Problem: Physical Therapy Goal  Goal: Physical Therapy Goal  Description  Goals to be met by: 20     Patient will increase functional independence with mobility by performin. Supine to sit with Modified [place]  2. Rolling to Left and Right with Modified [place]  3. Sit to stand transfer with Modified [place]  4. Bed to chair transfer with Modified [place]   5. Gait >50 feet with Modified [place] with or without appropriate AD  6. Lower extremity exercise program 3 sets x10 reps per handout, with independence     Outcome: Ongoing, Progressing   Patient Qian for bed mobility and supervision for sit to stand transfers. Patient gait trained x 15 feet using rolling walker with close CGA with slow eric and non weight bearing to left LE due to pain. Patient able to to performed TTWB/PWB with some encouragement for weight shifting  and to improve gait mechanics. Patient will continue to benefit from skilled physical therapy to aid in improving weight bearing tolerance and improve functional mobility and independence.

## 2020-04-26 NOTE — SUBJECTIVE & OBJECTIVE
Interval History: Feeling better.    Review of Systems   HENT: Negative for ear discharge and ear pain.    Eyes: Negative for discharge and itching.   Endocrine: Negative for cold intolerance and heat intolerance.   Neurological: Negative for seizures and syncope.     Objective:     Vital Signs (Most Recent):  Temp: 98.6 °F (37 °C) (04/26/20 0700)  Pulse: 89 (04/26/20 0700)  Resp: 20 (04/26/20 0700)  BP: (!) 111/59 (04/26/20 0700)  SpO2: 98 % (04/26/20 0700) Vital Signs (24h Range):  Temp:  [96.6 °F (35.9 °C)-98.7 °F (37.1 °C)] 98.6 °F (37 °C)  Pulse:  [80-94] 89  Resp:  [18-20] 20  SpO2:  [98 %-100 %] 98 %  BP: (111-123)/(56-71) 111/59     Weight: 97.8 kg (215 lb 9.8 oz)  Body mass index is 37.01 kg/m².    Intake/Output Summary (Last 24 hours) at 4/26/2020 0946  Last data filed at 4/25/2020 1700  Gross per 24 hour   Intake 240 ml   Output 825 ml   Net -585 ml      Physical Exam   Constitutional: She is oriented to person, place, and time. She appears well-developed. No distress.   Cardiovascular: Normal rate and intact distal pulses.   Pulmonary/Chest: Effort normal and breath sounds normal.   Abdominal: Soft.   Neurological: She is alert and oriented to person, place, and time.   Skin: She is not diaphoretic.   Psychiatric: She has a normal mood and affect. Her behavior is normal. Judgment and thought content normal.   Nursing note and vitals reviewed.      Significant Labs:   BMP:   No results for input(s): GLU, NA, K, CL, CO2, BUN, CREATININE, CALCIUM, MG in the last 48 hours.  CBC:   Recent Labs   Lab 04/25/20  0357 04/26/20  0528   WBC 6.86 5.59   HGB 7.3* 7.3*   HCT 24.2* 23.7*    330       Significant Imaging: I have reviewed all pertinent imaging results/findings within the past 24 hours.

## 2020-04-26 NOTE — PT/OT/SLP PROGRESS
Physical Therapy Treatment    Patient Name:  Wayne Garces   MRN:  [id number]    Recommendations:     Discharge Recommendations:  home   Discharge Equipment Recommendations: walker, rolling   Barriers to discharge: None    Assessment:     Wayne Garces is a 25 y.o. female admitted with a medical diagnosis of Acute deep vein thrombosis (DVT) of femoral vein of left lower extremity.  She presents with the following impairments/functional limitations:  weakness, impaired endurance, impaired self care skills, impaired functional mobilty, gait instability, impaired balance, decreased ROM, decreased lower extremity function, impaired muscle length, decreased safety awareness, pain, edema  Patient Qian for bed mobility and supervision for sit to stand transfers. Patient gait trained x 15 feet using rolling walker with close CGA with slow eric and non weight bearing to left LE due to pain. Patient able to to performed TTWB/PWB with some encouragement for weight shifting  and to improve gait mechanics. Patient will continue to benefit from skilled physical therapy to aid in improving weight bearing tolerance and improve functional mobility and independence..    Rehab Prognosis: Good; patient would benefit from acute skilled PT services to address these deficits and reach maximum level of function.    Recent Surgery: Procedure(s) (LRB):  THROMBOLYSIS OR THROMBECTOMY, BLOOD VESSEL, LOWER EXTREMITY (Left) 9 Days Post-Op    Plan:     During this hospitalization, patient to be seen 5 x/week to address the identified rehab impairments via gait training, therapeutic activities, therapeutic exercises and progress toward the following goals:    · Plan of Care Expires:  05/05/20    Subjective     Chief Complaint: left leg pain   Patient/Family Comments/goals: to go home   Pain/Comfort:  · Pain Rating 1: 7/10  · Location - Side 1: Left  · Location - Orientation 1: lower  · Location 1: leg  · Pain Addressed  1: Reposition, Nurse notified, Cessation of Activity  · Pain Rating Post-Intervention 1: 7/10      Objective:     Communicated with nurse Audrey  prior to session.  Patient found supine with peripheral IV upon PT entry to room.     General Precautions: Standard, fall   Orthopedic Precautions:N/A   Braces: N/A     Functional Mobility:  · Bed Mobility:     · Rolling Left:  modified independence  · Scooting: modified independence  · Supine to Sit: modified independence  · Sit to Supine: modified independence  · Transfers:     · Sit to Stand:  supervision with no AD  · Gait: 15 feet with rolling walker and close CGA and cueing to imrpove gait mechanics       AM-PAC 6 CLICK MOBILITY  Turning over in bed (including adjusting bedclothes, sheets and blankets)?: 4  Sitting down on and standing up from a chair with arms (e.g., wheelchair, bedside commode, etc.): 4  Moving from lying on back to sitting on the side of the bed?: 4  Moving to and from a bed to a chair (including a wheelchair)?: 3  Need to walk in hospital room?: 3  Climbing 3-5 steps with a railing?: 2  Basic Mobility Total Score: 20       Therapeutic Activities and Exercises:   Patient performed BLE therex including ankle pumps, long arc quads, hip flexion and hip adduction (pilow squeezes) 3 x 10 reps each. Limited range of motion with therex on left lower extremity     Patient left supine with all lines intact and call button in reach..    GOALS:   Multidisciplinary Problems     Physical Therapy Goals        Problem: Physical Therapy Goal    Goal Priority Disciplines Outcome Goal Variances Interventions   Physical Therapy Goal     PT, PT/OT Ongoing, Progressing     Description:  Goals to be met by: 20     Patient will increase functional independence with mobility by performin. Supine to sit with Modified [place]  2. Rolling to Left and Right with Modified [place]  3. Sit to stand transfer with Modified [place]  4. Bed to chair  transfer with Modified [place]   5. Gait >50 feet with Modified [place] with or without appropriate AD  6. Lower extremity exercise program 3 sets x10 reps per handout, with independence                      Time Tracking:     PT Received On: 04/26/20  PT Start Time: 1134     PT Stop Time: 1157  PT Total Time (min): 23 min     Billable Minutes: Gait Training 9 and Therapeutic Exercise 14    Treatment Type: Treatment  PT/PTA: PTA     PTA Visit Number: 4     Elina Valentin, ANKIT  04/26/2020

## 2020-04-27 VITALS
TEMPERATURE: 98 F | DIASTOLIC BLOOD PRESSURE: 61 MMHG | WEIGHT: 215.63 LBS | SYSTOLIC BLOOD PRESSURE: 115 MMHG | HEIGHT: 64 IN | BODY MASS INDEX: 36.81 KG/M2 | HEART RATE: 98 BPM | OXYGEN SATURATION: 97 % | RESPIRATION RATE: 18 BRPM

## 2020-04-27 DIAGNOSIS — U07.1 COVID-19 VIRUS DETECTED: ICD-10-CM

## 2020-04-27 LAB
BASOPHILS # BLD AUTO: 0.03 K/UL (ref 0–0.2)
BASOPHILS NFR BLD: 0.6 % (ref 0–1.9)
DIFFERENTIAL METHOD: ABNORMAL
EOSINOPHIL # BLD AUTO: 0.3 K/UL (ref 0–0.5)
EOSINOPHIL NFR BLD: 5.2 % (ref 0–8)
ERYTHROCYTE [DISTWIDTH] IN BLOOD BY AUTOMATED COUNT: 14.5 % (ref 11.5–14.5)
HCT VFR BLD AUTO: 24 % (ref 37–48.5)
HGB BLD-MCNC: 7.5 G/DL (ref 12–16)
IMM GRANULOCYTES # BLD AUTO: 0.2 K/UL (ref 0–0.04)
IMM GRANULOCYTES NFR BLD AUTO: 3.9 % (ref 0–0.5)
LYMPHOCYTES # BLD AUTO: 1.3 K/UL (ref 1–4.8)
LYMPHOCYTES NFR BLD: 24.3 % (ref 18–48)
MCH RBC QN AUTO: 27.2 PG (ref 27–31)
MCHC RBC AUTO-ENTMCNC: 31.3 G/DL (ref 32–36)
MCV RBC AUTO: 87 FL (ref 82–98)
MONOCYTES # BLD AUTO: 0.5 K/UL (ref 0.3–1)
MONOCYTES NFR BLD: 10.2 % (ref 4–15)
NEUTROPHILS # BLD AUTO: 2.9 K/UL (ref 1.8–7.7)
NEUTROPHILS NFR BLD: 55.8 % (ref 38–73)
NRBC BLD-RTO: 1 /100 WBC
PLATELET # BLD AUTO: 322 K/UL (ref 150–350)
PMV BLD AUTO: 11.5 FL (ref 9.2–12.9)
RBC # BLD AUTO: 2.76 M/UL (ref 4–5.4)
WBC # BLD AUTO: 5.19 K/UL (ref 3.9–12.7)

## 2020-04-27 PROCEDURE — 85025 COMPLETE CBC W/AUTO DIFF WBC: CPT

## 2020-04-27 PROCEDURE — 97110 THERAPEUTIC EXERCISES: CPT | Mod: CQ

## 2020-04-27 PROCEDURE — 36415 COLL VENOUS BLD VENIPUNCTURE: CPT

## 2020-04-27 PROCEDURE — 25000003 PHARM REV CODE 250: Performed by: INTERNAL MEDICINE

## 2020-04-27 PROCEDURE — 25000003 PHARM REV CODE 250: Performed by: HOSPITALIST

## 2020-04-27 PROCEDURE — 97116 GAIT TRAINING THERAPY: CPT | Mod: CQ

## 2020-04-27 RX ORDER — ASPIRIN 81 MG/1
81 TABLET ORAL DAILY
Refills: 0
Start: 2020-04-27 | End: 2021-04-27

## 2020-04-27 RX ADMIN — RIVAROXABAN 15 MG: 15 TABLET, FILM COATED ORAL at 06:04

## 2020-04-27 RX ADMIN — ASPIRIN 81 MG 81 MG: 81 TABLET ORAL at 10:04

## 2020-04-27 RX ADMIN — RIVAROXABAN 15 MG: 15 TABLET, FILM COATED ORAL at 10:04

## 2020-04-27 RX ADMIN — POLYETHYLENE GLYCOL 3350 17 G: 17 POWDER, FOR SOLUTION ORAL at 11:04

## 2020-04-27 NOTE — PLAN OF CARE
04/27/20 1522   Final Note   Assessment Type Final Discharge Note   Anticipated Discharge Disposition Home   Hospital Follow Up  Appt(s) scheduled? No   Discharge plans and expectations educations in teach back method with documentation complete? Yes   Right Care Referral Info   Post Acute Recommendation No Care   Post-Acute Status   Post-Acute Authorization Other   Other Status No Post-Acute Service Needs   Discharge Delays None known at this time

## 2020-04-27 NOTE — PT/OT/SLP PROGRESS
Physical Therapy Treatment    Patient Name:  Wayne Garces   MRN:  [id number]    Recommendations:     Discharge Recommendations:  home   Discharge Equipment Recommendations: walker, rolling   Barriers to discharge: None    Assessment:     Wayne Garces is a 25 y.o. female admitted with a medical diagnosis of Acute deep vein thrombosis (DVT) of femoral vein of left lower extremity.  She presents with the following impairments/functional limitations:  weakness, impaired endurance, impaired self care skills, impaired balance, decreased coordination, decreased safety awareness, pain, edema, gait instability, impaired functional mobilty, decreased lower extremity function.  Pt continues to display PWB with ambulation despite encouragement to increase WB.  Pt ambulated ~46ft in room with RW, SBA and PWB to LLE. Pt would continue to benefit from RW prior to discharging home.      Rehab Prognosis: Fair; patient would benefit from acute skilled PT services to address these deficits and reach maximum level of function.    Recent Surgery: Procedure(s) (LRB):  THROMBOLYSIS OR THROMBECTOMY, BLOOD VESSEL, LOWER EXTREMITY (Left) 10 Days Post-Op    Plan:     During this hospitalization, patient to be seen 5 x/week to address the identified rehab impairments via gait training, therapeutic activities, therapeutic exercises and progress toward the following goals:    · Plan of Care Expires:  05/05/20    Subjective     Chief Complaint: unable to place weight through leg  Patient/Family Comments/goals: Pt reports she needs to know what time she is going home so she can call a ride.  Communicated with pt via language line and handouts in [place] for therex.  Pain/Comfort:  · Pain Rating 1: 0/10      Objective:     Communicated with pt's nurse, Janeen and language line prior to session.  Patient found HOB elevated with peripheral IV upon PT entry to room.     General Precautions: Standard, fall   Orthopedic  Precautions:N/A   Braces: N/A     Functional Mobility:  · Bed Mobility:     · Scooting: modified independence  · Supine to Sit: modified independence  · Transfers:x3 from EOB     · Sit to Stand:  supervision with no AD  · Gait: Pt ambualted ~46ft with RW, SBA with verbal cues for promotion of heel>toe and to decrease WB throughout BUEs and into LLE.  Pt with quick step to RLE.  Decreased eric, velocity of limb, heel strike, step length, balance, and weight shifting ability.  · Balance: good sitting and fair + standing      AM-PAC 6 CLICK MOBILITY  Turning over in bed (including adjusting bedclothes, sheets and blankets)?: 4  Sitting down on and standing up from a chair with arms (e.g., wheelchair, bedside commode, etc.): 4  Moving from lying on back to sitting on the side of the bed?: 4  Moving to and from a bed to a chair (including a wheelchair)?: 4  Need to walk in hospital room?: 3  Climbing 3-5 steps with a railing?: 2  Basic Mobility Total Score: 21       Therapeutic Activities and Exercises:   Pt received handouts for HEP with education provided.  Today received handouts for ankle circles (CW/CCW), ankle ABCs, and towel crunches.  Pt lacks ankle/toe ROM to L.  Performed therex below to assist with improving ROM.  Pt performed weight shifting lateral and A/P.  A/P performed with L foot in front x15 each.  Therex to LLE x20 reps: Ankle pumps, ankle circles (CW, CCW), ankle alphabets (x1 A-Z)  Pt encouraged to use call button for assistance for all OOB/OOchair activity 2/2 fall risk. Pt verbalized understanding    Patient left reclined in [place] with LLE elevated with all lines intact, call button in reach and pt's nurse, Janeen, notified..    Axillary crutch training with patient: (Pt without insurance and unable to afford RW, will need crutch training.  Spoke with supervising PT)  Time in: 1606  Time out: 1618  Total Time: 12 minutes (1 GT)  Most of communication was through demonstration and little  english/Korean.  Pt educated/demonstrated on proper technique with sit<>stand  transfers while using B axillary crutches.  Pt verbalized and demonstrated understanding.  Pt educated/demonstrated on ambulation/technique with B axillary crutches.  Pt ambulated ~12ft x2 trials with B axillary crutches, SBA with mild LOB, however, able to self recover.  Pt ambulated with NWB due to increased pain with 3 point PWB.  At end of session pt reclined in [place], LLE elevated, call button/phone in reach and all needs met.  Pt's nurse, Janeen, notified.    GOALS:   Multidisciplinary Problems     Physical Therapy Goals        Problem: Physical Therapy Goal    Goal Priority Disciplines Outcome Goal Variances Interventions   Physical Therapy Goal     PT, PT/OT Ongoing, Progressing     Description:  Goals to be met by: 20     Patient will increase functional independence with mobility by performin. Supine to sit with Modified [place]  2. Rolling to Left and Right with Modified [place]  3. Sit to stand transfer with Modified [place]  4. Bed to chair transfer with Modified [place]   5. Gait >50 feet with Modified [place] with or without appropriate AD  6. Lower extremity exercise program 3 sets x10 reps per handout, with independence                      Time Tracking:     PT Received On: 20  PT Start Time: 1412     PT Stop Time: 1446  PT Total Time (min): 34 min     Billable Minutes: Gait Training 20 and Therapeutic Exercise 14    Treatment Type: Treatment  PT/PTA: PTA     PTA Visit Number: 5     Ariela Ancar, PTA  2020

## 2020-04-27 NOTE — PLAN OF CARE
Cost transfer completed for patient to go to [facility] to receive Xeralto 15mg to be taken twice daily until Friday PM.  On Friday patient instructed to go to [facility] on the [facility] in [place] to  a 30 day supply of Xeralto which is covered by a free trial card.      She has been instructed that someone from the [facility] will call her to schedule appointment for follow up

## 2020-04-27 NOTE — NURSING
Discharge instructions given to patient. Belongings given to patient. Both IVs removed and cardiac monitor. No prescriptions were written for the patient. Patient folder at desk was cleaned and put in filing bin. GANGA Garibay translated discharge instructions in Lithuanian. Discharge paperwork was written in Lithuanian. Review of picking up medications at [facility] today followed with picking up medications on Friday at [facility] pharmacy.

## 2020-04-27 NOTE — DISCHARGE SUMMARY
[facility] Hospital Medicine  Discharge Summary      Patient Name: Wayne Garces  MRN: [id number]  Admission Date: 2020  Hospital Length of Stay: 11 days  Discharge Date and Time:  2020 12:26 PM  Attending Physician: Melody Alba MD   Discharging Provider: Melody Alba MD  Primary Care Provider: Primary Doctor No      HPI:   This is 24 y.o female with no medical history who presents to the hospital complaint of left leg pain for 4 days and progressively worsening for the last 2 days. Patient reports pain starting at her left thigh and radiating throughout the leg for the last 2 days. Associated symptoms are redness and swelling of left upper leg. Pain described as continuous aching 10/10 during the interview. She reports pain getting better if she raised her leg for the first 2 days but now nothing can help. She tried to take Tylenol at home but Tylenol doesn't help the leg pain just helps with her headache only. She finds she has descreased range of motion of the knee secondary to pain but denies any numbness in the leg. She reports she went to the hospital on 3/31/2020 because of fever, cough and body ache but sent home without Covid 19 testing. She has not been ambulating much at home secondary to body aches and fatigue. Denies any SOB, fever, chest pain, abdominal pain, n/v/d. No history of clotting disorder or DVT. Not on birth control pills. Denies smoking. Surgical history includes  section and tubal ligation per report.    In ED, US LLE vein shows extensive deep venous thrombosis involving the left common femoral vein, femoral vein, greater saphenous vein, popliteal vein, as well as anterior and posterior tibial veins and peroneal vein. EKG with sinus tachycardia 126 H/H 11.7/36.5; coagulation studies normal; Covid-19 positive.    History obtained via language line.    Procedure(s) (LRB):  THROMBOLYSIS OR THROMBECTOMY, BLOOD VESSEL, LOWER  EXTREMITY (Left)      Hospital Course:   Ms Mcdonald presented with left lower extremity swelling secondary to extensive deep vein thrombus extending from left iliac vein down to posterior tibial veins. ED consulted vascular surgery recommending pharmaco-mechanical thrombectomy and initiating heparin drip in the interim pending tentative procedure. Per discussion between ED physician and vascular surgeon was to transfer to [facility] [place] for procedure. Patient tested positive for COVID-19, which is potentially cause for acute thrombus. Seems that because of positive test, transfer was cancelled. IR consulted. Underwent 2 sessions of thrombectomy/thrombolysis. IR physician incidentally noted severe focal narrowing of the proximal left common iliac vein concerning for external venous compression. Underwent venoplasty without resolution of stenosis, therefore had stent placed. Stenosis successfully treated with this. Patient tolerated procedure very well and transferred back to ICU for close monitoring. was on heparin infusion with plans to transition to NOAC. IR physician also recommends aspirin 81 mg daily indefinitely for stent. Patient is uninsured and has no SSN. Patient assistance program application submitted to assist with cost of NOAC which she needs for at least 3-6 months. Stepped down to floor 4/18,  Patient remains  stable on RA.ddi well with PT,OT.  SW is consulted for Xarelto arrangement.  Remains afebrile.her pain is improved,SW arranged Xarelto,follow up with [facility] is arranged,patient was discharged home with follow up, plan with [facility].     Consults:   Consults (From admission, onward)        Status Ordering Provider     Inpatient consult to Interventional Radiology  Once     Provider:  Darin Boo MD    Completed NAOMIE GARY     Inpatient consult to Social Work  Once     Provider:  (Not yet assigned)    SOY [place]     Inpatient consult to Vascular Surgery  Once      Provider:  Tariq Pelaez MD    Completed JENNIE BRASHER          No new Assessment & Plan notes have been filed under this hospital service since the last note was generated.  Service: Hospital Medicine    Final Active Diagnoses:    Diagnosis Date Noted POA    PRINCIPAL PROBLEM:  Acute deep vein thrombosis (DVT) of femoral vein of left lower extremity [I82.412] 04/16/2020 Yes    Acute deep vein thrombosis (DVT) of iliac vein of left lower extremity [I82.422] 04/17/2020 Yes    COVID-19 virus infection [U07.1] 04/16/2020 Yes    Obesity (BMI 35.0-39.9 without comorbidity) [E66.9] 04/16/2020 Yes    Acute deep vein thrombosis (DVT) of distal vein of left lower extremity [I82.4Z2] 04/16/2020 Yes      Problems Resolved During this Admission:       Discharged Condition: stable    Disposition: Home or Self Care    Follow Up:  Follow-up Information     [facility] [place].    Specialty:  Interventional Radiology  Contact information:  [address] [facility] [address] [phone]  Additional information:  [facility] - Check in at Patient Registration 1st Floor [facility] - Interventional Rad In 3 months.    Specialty:  Interventional Radiology  Contact information:  [address] [facility] [address] [phone]  Additional information:  Clinic [place], 9th Floor           Primary Doctor No In 1 week.               Patient Instructions:      Activity as tolerated       Significant Diagnostic Studies: Labs:   BMP: No results for input(s): GLU, NA, K, CL, CO2, BUN, CREATININE, CALCIUM, MG in the last 48 hours., CMP No results for input(s): NA, K, CL, CO2, GLU, BUN, CREATININE, CALCIUM, PROT, ALBUMIN, BILITOT, ALKPHOS, AST, ALT, ANIONGAP, ESTGFRAFRICA, EGFRNONAA in the last 48 hours., CBC   Recent Labs   Lab 04/26/20  0528 04/27/20  0531   WBC 5.59 5.19   HGB 7.3* 7.5*   HCT 23.7* 24.0*    322    and INR   Lab Results   Component Value  Date    INR 1.3 (H) 04/17/2020    INR 1.3 (H) 04/17/2020    INR 1.1 04/16/2020     Radiology: Ultrasound: legs,  Chest x ray     Pending Diagnostic Studies:     Procedure Component Value Units Date/Time    IR PTA Venous [[id number]] Resulted:  04/17/20 1755    Order Status:  Sent Lab Status:  In process Updated:  04/17/20 1849    IR Stent Placement Vein [[id number]] Resulted:  04/17/20 1755    Order Status:  Sent Lab Status:  In process Updated:  04/17/20 1849    IR Thrombolysis Art or Venous inc Imaging and Cessation [[id number]] Resulted:  04/17/20 1522    Order Status:  Sent Lab Status:  In process Updated:  04/17/20 1849    IR Ultrasound Guidance [[id number]] Resulted:  04/17/20 1755    Order Status:  Sent Lab Status:  In process Updated:  04/17/20 1849         Medications:  Reconciled Home Medications:      Medication List      START taking these medications    * rivaroxaban 15 mg Tab  Commonly known as:  XARELTO  Take 1 tablet (15 mg total) by mouth 2 (two) times daily with meals. for 5 days     * rivaroxaban 20 mg Tab  Commonly known as:  XARELTO  Take 1 tablet (20 mg total) by mouth daily with dinner or evening meal.  Start taking on:  May 2, 2020         * This list has 2 medication(s) that are the same as other medications prescribed for you. Read the directions carefully, and ask your doctor or other care provider to review them with you.            STOP taking these medications    acetaminophen 650 MG Tbsr  Commonly known as:  TYLENOL            Indwelling Lines/Drains at time of discharge:   Lines/Drains/Airways     None                 Time spent on the discharge of patient: over 30  minutes  Patient was seen and examined on the date of discharge and determined to be suitable for discharge.         Melody Alba MD  Department of Hospital Medicine  [facility]

## 2020-04-27 NOTE — PLAN OF CARE
Problem: Physical Therapy Goal  Goal: Physical Therapy Goal  Description  Goals to be met by: 20     Patient will increase functional independence with mobility by performin. Supine to sit with Modified Burns  2. Rolling to Left and Right with Modified Burns  3. Sit to stand transfer with Modified Burns  4. Bed to chair transfer with Modified Burns   5. Gait >50 feet with Modified Burns with or without appropriate AD  6. Lower extremity exercise program 3 sets x10 reps per handout, with independence     Outcome: Ongoing, Progressing   Pt continues to display PWB with ambulation despite encouragement to increase WB.  Pt ambulated ~46ft in room with RW, SBA and PWB to LLE. Pt would continue to benefit from RW prior to discharging home

## 2020-04-27 NOTE — PROGRESS NOTES
[facility] Hospital Medicine  Progress Note    Patient Name: Wayne Garces  MRN: [id number]  Patient Class: IP- Inpatient   Admission Date: 2020  Length of Stay: 11 days  Attending Physician: Melody Alba MD  Primary Care Provider: Primary Doctor No        Subjective:     Principal Problem:Acute deep vein thrombosis (DVT) of femoral vein of left lower extremity        HPI:  This is 24 y.o female with no medical history who presents to the hospital complaint of left leg pain for 4 days and progressively worsening for the last 2 days. Patient reports pain starting at her left thigh and radiating throughout the leg for the last 2 days. Associated symptoms are redness and swelling of left upper leg. Pain described as continuous aching 10/10 during the interview. She reports pain getting better if she raised her leg for the first 2 days but now nothing can help. She tried to take Tylenol at home but Tylenol doesn't help the leg pain just helps with her headache only. She finds she has descreased range of motion of the knee secondary to pain but denies any numbness in the leg. She reports she went to the hospital on 3/31/2020 because of fever, cough and body ache but sent home without Covid 19 testing. She has not been ambulating much at home secondary to body aches and fatigue. Denies any SOB, fever, chest pain, abdominal pain, n/v/d. No history of clotting disorder or DVT. Not on birth control pills. Denies smoking. Surgical history includes  section and tubal ligation per report.    In ED, US LLE vein shows extensive deep venous thrombosis involving the left common femoral vein, femoral vein, greater saphenous vein, popliteal vein, as well as anterior and posterior tibial veins and peroneal vein. EKG with sinus tachycardia 126 H/H 11.7/36.5; coagulation studies normal; Covid-19 positive.    History obtained via language line.    Overview/Hospital Course:  Ms Mcdonald  presented with left lower extremity swelling secondary to extensive deep vein thrombus extending from left iliac vein down to posterior tibial veins. ED consulted vascular surgery recommending pharmaco-mechanical thrombectomy and initiating heparin drip in the interim pending tentative procedure. Per discussion between ED physician and vascular surgeon was to transfer to [facility] [place] for procedure. Patient tested positive for COVID-19, which is potentially cause for acute thrombus. Seems that because of positive test, transfer was cancelled. IR consulted. Underwent 2 sessions of thrombectomy/thrombolysis. IR physician incidentally noted severe focal narrowing of the proximal left common iliac vein concerning for external venous compression. Underwent venoplasty without resolution of stenosis, therefore had stent placed. Stenosis successfully treated with this. Patient tolerated procedure very well and transferred back to ICU for close monitoring. Is on heparin infusion with plans to transition to NOAC. IR physician also recommends aspirin 81 mg daily indefinitely for stent. Patient is uninsured and has no SSN. Patient assistance program application submitted to assist with cost of NOAC which she needs for at least 3-6 months. Stepped down to floor 4/18,  Patient is stable on RA.  SW is consulted for Xarelto arrangement.  Afebrile.    Interval History: Feeling better.    Review of Systems   HENT: Negative for ear discharge and ear pain.    Eyes: Negative for discharge and itching.   Endocrine: Negative for cold intolerance and heat intolerance.   Neurological: Negative for seizures and syncope.     Objective:     Vital Signs (Most Recent):  Temp: 98.3 °F (36.8 °C) (04/27/20 0855)  Pulse: 78 (04/27/20 0855)  Resp: 18 (04/27/20 0855)  BP: (!) 102/53 (04/27/20 0855)  SpO2: 98 % (04/27/20 0855) Vital Signs (24h Range):  Temp:  [98.3 °F (36.8 °C)-99.7 °F (37.6 °C)] 98.3 °F (36.8 °C)  Pulse:  [78-91] 78  Resp:  [17-20]  18  SpO2:  [96 %-100 %] 98 %  BP: (102-127)/(53-71) 102/53     Weight: 97.8 kg (215 lb 9.8 oz)  Body mass index is 37.01 kg/m².    Intake/Output Summary (Last 24 hours) at 4/27/2020 0856  Last data filed at 4/26/2020 1625  Gross per 24 hour   Intake 240 ml   Output --   Net 240 ml      Physical Exam   Constitutional: She is oriented to person, place, and time. She appears well-developed. No distress.   Cardiovascular: Normal rate and intact distal pulses.   Pulmonary/Chest: Effort normal and breath sounds normal.   Abdominal: Soft.   Neurological: She is alert and oriented to person, place, and time.   Skin: She is not diaphoretic.   Psychiatric: She has a normal mood and affect. Her behavior is normal. Judgment and thought content normal.   Nursing note and vitals reviewed.      Significant Labs:   BMP:   No results for input(s): GLU, NA, K, CL, CO2, BUN, CREATININE, CALCIUM, MG in the last 48 hours.  CBC:   Recent Labs   Lab 04/26/20  0528 04/27/20  0531   WBC 5.59 5.19   HGB 7.3* 7.5*   HCT 23.7* 24.0*    322       Significant Imaging: I have reviewed all pertinent imaging results/findings within the past 24 hours.      Assessment/Plan:      * Acute deep vein thrombosis (DVT) of femoral vein of left lower extremity  Large thrombus likely related to COVID-19 infection  IR consulted. Underwent two sessions of thrombectomy/thrombolysis and stent placement in left internal iliac vein for severe stenosis not resolved with venoplasty. This was successful  Working with  to help patient with cost of NOAC she will need for at least 3 months  Is uninsured and does not have a SSN. I discussed with   Stop dilaudid PCA and started oral narcotic pain regimen with breakthrough IV morphine if needed.  Stop Heparin gtt and start Xarelto.  SW consulted for NOAC options.  PT evaluation.    Acute deep vein thrombosis (DVT) of iliac vein of left lower extremity  As above      Acute deep vein  thrombosis (DVT) of distal vein of left lower extremity  See above    Obesity (BMI 35.0-39.9 without comorbidity)  Recommend diet and lifestyle modifications outpatient    COVID-19 virus infection  Covid positive. Possibly contributory to DVT given risk for hyper coaguable state.   Respiratory status very stable,on RA.  Afebrile.      VTE Risk Mitigation (From admission, onward)         Ordered     rivaroxaban tablet 15 mg  2 times daily with meals      04/20/20 0855     IP VTE HIGH RISK PATIENT  Once      04/16/20 1951                      Melody Alba MD  Department of Hospital Medicine   [facility]

## 2020-04-27 NOTE — SUBJECTIVE & OBJECTIVE
Interval History: Feeling better.    Review of Systems   HENT: Negative for ear discharge and ear pain.    Eyes: Negative for discharge and itching.   Endocrine: Negative for cold intolerance and heat intolerance.   Neurological: Negative for seizures and syncope.     Objective:     Vital Signs (Most Recent):  Temp: 98.3 °F (36.8 °C) (04/27/20 0855)  Pulse: 78 (04/27/20 0855)  Resp: 18 (04/27/20 0855)  BP: (!) 102/53 (04/27/20 0855)  SpO2: 98 % (04/27/20 0855) Vital Signs (24h Range):  Temp:  [98.3 °F (36.8 °C)-99.7 °F (37.6 °C)] 98.3 °F (36.8 °C)  Pulse:  [78-91] 78  Resp:  [17-20] 18  SpO2:  [96 %-100 %] 98 %  BP: (102-127)/(53-71) 102/53     Weight: 97.8 kg (215 lb 9.8 oz)  Body mass index is 37.01 kg/m².    Intake/Output Summary (Last 24 hours) at 4/27/2020 0856  Last data filed at 4/26/2020 1625  Gross per 24 hour   Intake 240 ml   Output --   Net 240 ml      Physical Exam   Constitutional: She is oriented to person, place, and time. She appears well-developed. No distress.   Cardiovascular: Normal rate and intact distal pulses.   Pulmonary/Chest: Effort normal and breath sounds normal.   Abdominal: Soft.   Neurological: She is alert and oriented to person, place, and time.   Skin: She is not diaphoretic.   Psychiatric: She has a normal mood and affect. Her behavior is normal. Judgment and thought content normal.   Nursing note and vitals reviewed.      Significant Labs:   BMP:   No results for input(s): GLU, NA, K, CL, CO2, BUN, CREATININE, CALCIUM, MG in the last 48 hours.  CBC:   Recent Labs   Lab 04/26/20  0528 04/27/20  0531   WBC 5.59 5.19   HGB 7.3* 7.5*   HCT 23.7* 24.0*    322       Significant Imaging: I have reviewed all pertinent imaging results/findings within the past 24 hours.

## 2021-11-18 NOTE — NURSING
MD Boo at bedside. Md aware of labs. Neurovascular and neuro assessment performed at bedside. Questions answered. Will continue to monitor patient closely.    No